# Patient Record
Sex: MALE | Race: WHITE | HISPANIC OR LATINO
[De-identification: names, ages, dates, MRNs, and addresses within clinical notes are randomized per-mention and may not be internally consistent; named-entity substitution may affect disease eponyms.]

---

## 2018-05-22 ENCOUNTER — APPOINTMENT (OUTPATIENT)
Dept: INTERNAL MEDICINE | Facility: CLINIC | Age: 83
End: 2018-05-22

## 2019-08-15 ENCOUNTER — INPATIENT (INPATIENT)
Facility: HOSPITAL | Age: 84
LOS: 4 days | Discharge: HOME | DRG: 378 | End: 2019-08-20
Attending: HOSPITALIST | Admitting: HOSPITALIST
Payer: COMMERCIAL

## 2019-08-15 VITALS
DIASTOLIC BLOOD PRESSURE: 52 MMHG | TEMPERATURE: 99 F | SYSTOLIC BLOOD PRESSURE: 102 MMHG | HEIGHT: 65 IN | OXYGEN SATURATION: 98 % | HEART RATE: 101 BPM | WEIGHT: 102.07 LBS | RESPIRATION RATE: 16 BRPM

## 2019-08-15 LAB
ABO RH CONFIRMATION: SIGNIFICANT CHANGE UP
ALBUMIN SERPL ELPH-MCNC: 3.9 G/DL — SIGNIFICANT CHANGE UP (ref 3.3–5.2)
ALP SERPL-CCNC: 87 U/L — SIGNIFICANT CHANGE UP (ref 40–120)
ALT FLD-CCNC: 23 U/L — SIGNIFICANT CHANGE UP
ANION GAP SERPL CALC-SCNC: 11 MMOL/L — SIGNIFICANT CHANGE UP (ref 5–17)
ANISOCYTOSIS BLD QL: SLIGHT — SIGNIFICANT CHANGE UP
APTT BLD: 24.9 SEC — LOW (ref 27.5–36.3)
AST SERPL-CCNC: 56 U/L — HIGH
BASOPHILS # BLD AUTO: 0.02 K/UL — SIGNIFICANT CHANGE UP (ref 0–0.2)
BASOPHILS NFR BLD AUTO: 0.2 % — SIGNIFICANT CHANGE UP (ref 0–2)
BILIRUB SERPL-MCNC: 0.5 MG/DL — SIGNIFICANT CHANGE UP (ref 0.4–2)
BLD GP AB SCN SERPL QL: SIGNIFICANT CHANGE UP
BUN SERPL-MCNC: 14 MG/DL — SIGNIFICANT CHANGE UP (ref 8–20)
CALCIUM SERPL-MCNC: 9.6 MG/DL — SIGNIFICANT CHANGE UP (ref 8.6–10.2)
CHLORIDE SERPL-SCNC: 98 MMOL/L — SIGNIFICANT CHANGE UP (ref 98–107)
CO2 SERPL-SCNC: 26 MMOL/L — SIGNIFICANT CHANGE UP (ref 22–29)
CREAT SERPL-MCNC: 0.63 MG/DL — SIGNIFICANT CHANGE UP (ref 0.5–1.3)
ELLIPTOCYTES BLD QL SMEAR: SLIGHT — SIGNIFICANT CHANGE UP
EOSINOPHIL # BLD AUTO: 0.05 K/UL — SIGNIFICANT CHANGE UP (ref 0–0.5)
EOSINOPHIL NFR BLD AUTO: 0.6 % — SIGNIFICANT CHANGE UP (ref 0–6)
GLUCOSE SERPL-MCNC: 107 MG/DL — SIGNIFICANT CHANGE UP (ref 70–115)
HCT VFR BLD CALC: 25.7 % — LOW (ref 39–50)
HGB BLD-MCNC: 7.9 G/DL — LOW (ref 13–17)
HYPOCHROMIA BLD QL: SLIGHT — SIGNIFICANT CHANGE UP
IMM GRANULOCYTES NFR BLD AUTO: 0.4 % — SIGNIFICANT CHANGE UP (ref 0–1.5)
INR BLD: 1.13 RATIO — SIGNIFICANT CHANGE UP (ref 0.88–1.16)
LYMPHOCYTES # BLD AUTO: 2.02 K/UL — SIGNIFICANT CHANGE UP (ref 1–3.3)
LYMPHOCYTES # BLD AUTO: 24.6 % — SIGNIFICANT CHANGE UP (ref 13–44)
MACROCYTES BLD QL: SIGNIFICANT CHANGE UP
MANUAL SMEAR VERIFICATION: SIGNIFICANT CHANGE UP
MCHC RBC-ENTMCNC: 30.7 GM/DL — LOW (ref 32–36)
MCHC RBC-ENTMCNC: 33.3 PG — SIGNIFICANT CHANGE UP (ref 27–34)
MCV RBC AUTO: 108.4 FL — HIGH (ref 80–100)
MICROCYTES BLD QL: SLIGHT — SIGNIFICANT CHANGE UP
MONOCYTES # BLD AUTO: 1.01 K/UL — HIGH (ref 0–0.9)
MONOCYTES NFR BLD AUTO: 12.3 % — SIGNIFICANT CHANGE UP (ref 2–14)
NEUTROPHILS # BLD AUTO: 5.08 K/UL — SIGNIFICANT CHANGE UP (ref 1.8–7.4)
NEUTROPHILS NFR BLD AUTO: 61.9 % — SIGNIFICANT CHANGE UP (ref 43–77)
OB PNL STL: POSITIVE
OVALOCYTES BLD QL SMEAR: SLIGHT — SIGNIFICANT CHANGE UP
PLAT MORPH BLD: NORMAL — SIGNIFICANT CHANGE UP
PLATELET # BLD AUTO: 208 K/UL — SIGNIFICANT CHANGE UP (ref 150–400)
POIKILOCYTOSIS BLD QL AUTO: SLIGHT — SIGNIFICANT CHANGE UP
POLYCHROMASIA BLD QL SMEAR: SIGNIFICANT CHANGE UP
POTASSIUM SERPL-MCNC: 4.6 MMOL/L — SIGNIFICANT CHANGE UP (ref 3.5–5.3)
POTASSIUM SERPL-SCNC: 4.6 MMOL/L — SIGNIFICANT CHANGE UP (ref 3.5–5.3)
PROT SERPL-MCNC: 7.3 G/DL — SIGNIFICANT CHANGE UP (ref 6.6–8.7)
PROTHROM AB SERPL-ACNC: 13.1 SEC — HIGH (ref 10–12.9)
RBC # BLD: 2.37 M/UL — LOW (ref 4.2–5.8)
RBC # FLD: 16.5 % — HIGH (ref 10.3–14.5)
RBC BLD AUTO: ABNORMAL
SODIUM SERPL-SCNC: 135 MMOL/L — SIGNIFICANT CHANGE UP (ref 135–145)
TARGETS BLD QL SMEAR: SLIGHT — SIGNIFICANT CHANGE UP
TROPONIN T SERPL-MCNC: <0.01 NG/ML — SIGNIFICANT CHANGE UP (ref 0–0.06)
WBC # BLD: 8.21 K/UL — SIGNIFICANT CHANGE UP (ref 3.8–10.5)
WBC # FLD AUTO: 8.21 K/UL — SIGNIFICANT CHANGE UP (ref 3.8–10.5)

## 2019-08-15 PROCEDURE — 71045 X-RAY EXAM CHEST 1 VIEW: CPT | Mod: 26

## 2019-08-15 PROCEDURE — 70450 CT HEAD/BRAIN W/O DYE: CPT | Mod: 26

## 2019-08-15 PROCEDURE — 93010 ELECTROCARDIOGRAM REPORT: CPT

## 2019-08-15 PROCEDURE — 99285 EMERGENCY DEPT VISIT HI MDM: CPT

## 2019-08-15 RX ORDER — SODIUM CHLORIDE 9 MG/ML
1000 INJECTION INTRAMUSCULAR; INTRAVENOUS; SUBCUTANEOUS ONCE
Refills: 0 | Status: COMPLETED | OUTPATIENT
Start: 2019-08-15 | End: 2019-08-15

## 2019-08-15 RX ADMIN — SODIUM CHLORIDE 1000 MILLILITER(S): 9 INJECTION INTRAMUSCULAR; INTRAVENOUS; SUBCUTANEOUS at 21:45

## 2019-08-15 RX ADMIN — SODIUM CHLORIDE 1000 MILLILITER(S): 9 INJECTION INTRAMUSCULAR; INTRAVENOUS; SUBCUTANEOUS at 20:45

## 2019-08-15 NOTE — ED ADULT NURSE NOTE - OBJECTIVE STATEMENT
Pt c/o black tarry stool, dizziness x 7 days. Pt c/o black tarry stool, dizziness x 7 days. Pt also has a inguinal hernia that family states "he has had for 3o tears". Pt family states they followed up with many people and the pt kept being told different things and so he stopped worrying about. Pt has hx of HTN, prostate ca.

## 2019-08-15 NOTE — ED PROVIDER NOTE - OBJECTIVE STATEMENT
83yoM; with pmh signif for chronic inguinal hernia, HTN, HLD; now p/w lightheadedness--x3 days, near-syncope sensation. c/o dark stools x8 days, x2-3 episodes daily. denies f/c/s. denies n/v/d. c/o increased pain over hernia site.  last BM and flatus few hours ago.  denies dysuria, hematuria, frequency, urgency. denies melena, hematochezia.  PMH; chronic inguinal hernia, HTn, hld  SOCIAL: No tobacco/illicit substance use/socialEtOH

## 2019-08-15 NOTE — ED PROVIDER NOTE - NS ED ROS FT
Constitutional: (-) fever  (-)chills  (-)sweats  Eyes/ENT: (-) blurry vision, (-) epistaxis  (-)rhinorrhea   (-) sore throat    Cardiovascular: (-) chest pain, (-) palpitations (-) edema   Respiratory: (-) cough, (-) shortness of breath   Gastrointestinal: (-)nausea  (-)vomiting, (-) diarrhea  (-) abdominal pain +dark stool  :  (-)dysuria, (-)frequency, (-)urgency, (-)hematuria  Musculoskeletal: (-) neck pain, (-) back pain, (-) joint pain  Integumentary: (-) rash, (-) edema  Neurological: (-) headache, (-) altered mental status  (-)LOC  +lightheadedness

## 2019-08-15 NOTE — ED STATDOCS - PROGRESS NOTE DETAILS
Megha Mckee for Dr Enmanuel Padilla :L 84 yo M, with hx of AICD, presenting to the ED with c/o dizziness. Pt states for the last 8 days, he has been having room spinning dizziness. Also with some black stools. nO hx of the same. Denies CP, SOB, and fever. Sent to main for further evaluation.

## 2019-08-15 NOTE — ED PROVIDER NOTE - PHYSICAL EXAMINATION
General:     NAD, well-nourished, well-appearing  Head:     NC/AT, EOMI, oral mucosa moist  Neck:     trachea midline  Lungs:     CTA b/l, no w/r/r  CVS:     S1S2, RRR, no m/g/r  Abd:     +BS, s/nt/nd, no organomegaly  Rectal: dark stool   (chaperoned by JONATHAN Doe):  partially reducible right inguinal hernia  Ext:    2+ radial and pedal pulses, no c/c/e  Neuro: AAOx3, no sensory/motor deficits

## 2019-08-16 DIAGNOSIS — R19.5 OTHER FECAL ABNORMALITIES: ICD-10-CM

## 2019-08-16 DIAGNOSIS — K92.2 GASTROINTESTINAL HEMORRHAGE, UNSPECIFIED: ICD-10-CM

## 2019-08-16 DIAGNOSIS — D64.9 ANEMIA, UNSPECIFIED: ICD-10-CM

## 2019-08-16 LAB
FERRITIN SERPL-MCNC: 28 NG/ML — LOW (ref 30–400)
FOLATE SERPL-MCNC: 17.3 NG/ML — SIGNIFICANT CHANGE UP
HCT VFR BLD CALC: 30.8 % — LOW (ref 39–50)
HGB BLD-MCNC: 9.7 G/DL — LOW (ref 13–17)
IRON SATN MFR SERPL: 12 % — LOW (ref 16–55)
IRON SATN MFR SERPL: 41 UG/DL — LOW (ref 59–158)
MCHC RBC-ENTMCNC: 31.1 PG — SIGNIFICANT CHANGE UP (ref 27–34)
MCHC RBC-ENTMCNC: 31.5 GM/DL — LOW (ref 32–36)
MCV RBC AUTO: 98.7 FL — SIGNIFICANT CHANGE UP (ref 80–100)
PLATELET # BLD AUTO: 165 K/UL — SIGNIFICANT CHANGE UP (ref 150–400)
RBC # BLD: 3.12 M/UL — LOW (ref 4.2–5.8)
RBC # FLD: 18.5 % — HIGH (ref 10.3–14.5)
TIBC SERPL-MCNC: 332 UG/DL — SIGNIFICANT CHANGE UP (ref 220–430)
TRANSFERRIN SERPL-MCNC: 232 MG/DL — SIGNIFICANT CHANGE UP (ref 180–329)
VIT B12 SERPL-MCNC: 524 PG/ML — SIGNIFICANT CHANGE UP (ref 232–1245)
WBC # BLD: 8.26 K/UL — SIGNIFICANT CHANGE UP (ref 3.8–10.5)
WBC # FLD AUTO: 8.26 K/UL — SIGNIFICANT CHANGE UP (ref 3.8–10.5)

## 2019-08-16 PROCEDURE — 99232 SBSQ HOSP IP/OBS MODERATE 35: CPT

## 2019-08-16 PROCEDURE — 99223 1ST HOSP IP/OBS HIGH 75: CPT

## 2019-08-16 PROCEDURE — 12345: CPT | Mod: NC

## 2019-08-16 PROCEDURE — 74177 CT ABD & PELVIS W/CONTRAST: CPT | Mod: 26

## 2019-08-16 PROCEDURE — 71045 X-RAY EXAM CHEST 1 VIEW: CPT | Mod: 26

## 2019-08-16 RX ORDER — PANTOPRAZOLE SODIUM 20 MG/1
40 TABLET, DELAYED RELEASE ORAL EVERY 12 HOURS
Refills: 0 | Status: DISCONTINUED | OUTPATIENT
Start: 2019-08-16 | End: 2019-08-19

## 2019-08-16 RX ORDER — SPIRONOLACTONE 25 MG/1
25 TABLET, FILM COATED ORAL DAILY
Refills: 0 | Status: DISCONTINUED | OUTPATIENT
Start: 2019-08-16 | End: 2019-08-20

## 2019-08-16 RX ORDER — PANTOPRAZOLE SODIUM 20 MG/1
8 TABLET, DELAYED RELEASE ORAL
Qty: 80 | Refills: 0 | Status: DISCONTINUED | OUTPATIENT
Start: 2019-08-16 | End: 2019-08-16

## 2019-08-16 RX ORDER — FUROSEMIDE 40 MG
20 TABLET ORAL ONCE
Refills: 0 | Status: COMPLETED | OUTPATIENT
Start: 2019-08-16 | End: 2019-08-16

## 2019-08-16 RX ORDER — SODIUM CHLORIDE 9 MG/ML
1000 INJECTION INTRAMUSCULAR; INTRAVENOUS; SUBCUTANEOUS
Refills: 0 | Status: DISCONTINUED | OUTPATIENT
Start: 2019-08-16 | End: 2019-08-16

## 2019-08-16 RX ADMIN — PANTOPRAZOLE SODIUM 40 MILLIGRAM(S): 20 TABLET, DELAYED RELEASE ORAL at 17:23

## 2019-08-16 RX ADMIN — SODIUM CHLORIDE 100 MILLILITER(S): 9 INJECTION INTRAMUSCULAR; INTRAVENOUS; SUBCUTANEOUS at 10:35

## 2019-08-16 RX ADMIN — PANTOPRAZOLE SODIUM 10 MG/HR: 20 TABLET, DELAYED RELEASE ORAL at 06:53

## 2019-08-16 RX ADMIN — Medication 20 MILLIGRAM(S): at 23:38

## 2019-08-16 NOTE — PROGRESS NOTE ADULT - SUBJECTIVE AND OBJECTIVE BOX
OSCAR ECHOLS  ----------------------------------------  The patient was seen and evaluated for anemia.  The patient is in no acute distress.  Denied any chest pain, palpitations, dyspnea, or abdominal pain.  Offers no complaints.    Vital Signs Last 24 Hrs  T(C): 36.6 (16 Aug 2019 10:19), Max: 37.1 (15 Aug 2019 18:14)  T(F): 97.9 (16 Aug 2019 10:19), Max: 98.7 (15 Aug 2019 18:14)  HR: 79 (16 Aug 2019 10:19) (79 - 101)  BP: 96/54 (16 Aug 2019 10:19) (88/45 - 103/60)  BP(mean): --  RR: 18 (16 Aug 2019 10:19) (16 - 18)  SpO2: 97% (16 Aug 2019 10:19) (92% - 100%)    PHYSICAL EXAMINATION:  ----------------------------------------  General appearance: No acute distress, Awake, Alert  HEENT: Normocephalic, Atraumatic, Conjunctiva clear, EOMI  Neck: Supple, No JVD, No tenderness  Lungs: Breath sound equal bilaterally, No wheezes, No rales  Cardiovascular: S1S2, Regular rhythm  Abdomen: Soft, Nontender, Nondistended, No guarding/rebound, Positive bowel sounds  Extremities: No clubbing, No cyanosis, No edema, No calf tenderness  Neuro: Strength equal bilaterally, No tremors  Psychiatric: Appropriate mood, Normal affect    LABORATORY STUDIES:  ----------------------------------------             9.7    8.26  )-----------( 165      ( 16 Aug 2019 11:36 )             30.8     08-15    135  |  98  |  14.0  ----------------------------<  107  4.6   |  26.0  |  0.63    Ca    9.6      15 Aug 2019 20:48    TPro  7.3  /  Alb  3.9  /  TBili  0.5  /  DBili  x   /  AST  56<H>  /  ALT  23  /  AlkPhos  87  08-15    LIVER FUNCTIONS - ( 15 Aug 2019 20:48 )  Alb: 3.9 g/dL / Pro: 7.3 g/dL / ALK PHOS: 87 U/L / ALT: 23 U/L / AST: 56 U/L / GGT: x           PT/INR - ( 15 Aug 2019 20:48 )   PT: 13.1 sec;   INR: 1.13 ratio    PTT - ( 15 Aug 2019 20:48 )  PTT:24.9 sec    CARDIAC MARKERS ( 15 Aug 2019 20:48 )  x     / <0.01 ng/mL / x     / x     / x        MEDICATIONS  (STANDING):  pantoprazole  Injectable 40 milliGRAM(s) IV Push every 12 hours  sodium chloride 0.9%. 1000 milliLiter(s) (100 mL/Hr) IV Continuous <Continuous>      ASSESSMENT / PLAN:  ----------------------------------------  82 y/o male with PMH of HTN, HLD, chronic hernia, came to the ED complaining of dizziness and near syncope of 3 days duration. Patient noted he has been having dark stools for about 8 days now approximately 2 bowel movement per day and every episode is dark. He has no prior hx; has never done colonoscopy in the past.     Symptomatic anemia 2/2 GI bleed   Admit to monitor bed   FOBT: positive   CT abdomen: no acute bleeding   H/H: 7.9/25.7  2 units of PRBC ordered in the ED; 1 given, the other pending   PPI drip started  GI consult     Inguinal hernia   CT abdomen: large right inguinal hernia with non-obstructed small bowel   Surgical follow up as outpatient as needed     Abdominal Aneurysm  CT abdomen: new aneurysmal dilation of infrarenal abdominal aorta 3.4cm   Monitor and vascular follow up as needed.      HTN/HLD   Patient could not recall the name of his medications; med rec in AM please   Hold anti-hypertensive for now due to soft BP   Monitor BP     Supportive   DVT prophylaxis: CSD   Diet: NPO     Estimate date of discharge OSCAR ECHOLS  ----------------------------------------  The patient was seen and evaluated for anemia.  The patient is in no acute distress.  Denied any chest pain, palpitations, dyspnea, or abdominal pain.  Offers no complaints.    Vital Signs Last 24 Hrs  T(C): 36.6 (16 Aug 2019 10:19), Max: 37.1 (15 Aug 2019 18:14)  T(F): 97.9 (16 Aug 2019 10:19), Max: 98.7 (15 Aug 2019 18:14)  HR: 79 (16 Aug 2019 10:19) (79 - 101)  BP: 96/54 (16 Aug 2019 10:19) (88/45 - 103/60)  BP(mean): --  RR: 18 (16 Aug 2019 10:19) (16 - 18)  SpO2: 97% (16 Aug 2019 10:19) (92% - 100%)    PHYSICAL EXAMINATION:  ----------------------------------------  General appearance: No acute distress, Awake, Alert  HEENT: Normocephalic, Atraumatic, Conjunctiva clear, EOMI  Neck: Supple, No JVD, No tenderness  Lungs: Breath sound equal bilaterally, No wheezes, No rales  Cardiovascular: S1S2, Regular rhythm  Abdomen: Soft, Nontender, Nondistended, No guarding/rebound, Positive bowel sounds  Extremities: No clubbing, No cyanosis, No edema, No calf tenderness  Neuro: Strength equal bilaterally, No tremors  Psychiatric: Appropriate mood, Normal affect    LABORATORY STUDIES:  ----------------------------------------             9.7    8.26  )-----------( 165      ( 16 Aug 2019 11:36 )             30.8     08-15    135  |  98  |  14.0  ----------------------------<  107  4.6   |  26.0  |  0.63    Ca    9.6      15 Aug 2019 20:48    TPro  7.3  /  Alb  3.9  /  TBili  0.5  /  DBili  x   /  AST  56<H>  /  ALT  23  /  AlkPhos  87  08-15    LIVER FUNCTIONS - ( 15 Aug 2019 20:48 )  Alb: 3.9 g/dL / Pro: 7.3 g/dL / ALK PHOS: 87 U/L / ALT: 23 U/L / AST: 56 U/L / GGT: x           PT/INR - ( 15 Aug 2019 20:48 )   PT: 13.1 sec;   INR: 1.13 ratio    PTT - ( 15 Aug 2019 20:48 )  PTT:24.9 sec    CARDIAC MARKERS ( 15 Aug 2019 20:48 )  x     / <0.01 ng/mL / x     / x     / x        MEDICATIONS  (STANDING):  pantoprazole  Injectable 40 milliGRAM(s) IV Push every 12 hours  sodium chloride 0.9%. 1000 milliLiter(s) (100 mL/Hr) IV Continuous <Continuous>      ASSESSMENT / PLAN:  ----------------------------------------  84 y/o male with PMH of HTN, HLD, chronic hernia, came to the ED complaining of dizziness and near syncope of 3 days duration. Patient noted he has been having dark stools for about 8 days now approximately 2 bowel movement per day and every episode is dark. He has no prior hx; has never done colonoscopy in the past.     Acute blood loss anemia / Gastrointestinal hemorrhage - Status post transfusion of packed red blood cells. On pantoprazole infusion. Gastroenterology consultation noted. For eventual endoscopic evaluation. CBC to be repeated.    Hypertension - Close blood pressure monitoring. On intravenous fluids for hypotension.    Inguinal hernia - No obstruction noted.

## 2019-08-16 NOTE — ED ADULT NURSE REASSESSMENT NOTE - NS ED NURSE REASSESS COMMENT FT1
pt status unchanged, refer to flowsheet and chart, pt safety maintained, pt hemodynamically stable. Pt awaiting CT. POC explained to pt and family, verbalized understanding.

## 2019-08-16 NOTE — H&P ADULT - ASSESSMENT
82 y/o male with PMH of HTN, HLD came to the ED complaining of dizziness and near syncope of 3 days duration. Patient noted he has been having dark stools for about 8 days now approximately 2 bowel movement per day and every episode is dark. He has no nausea/vomiting, abdominal pain, chest pain, shortness of breath, palpitation, HA, LOC, recent travel, sick contact, hematuria, hematochezia.       Symptomatic anemia 2/2 GI bleed   Admit to monitor bed   FOBT: positive   H/H: 7.9/25.7  2 units of PRBC given in the ED   PPI 40mg IV bid   GI consult     HTN/HLD   Hold anti-hypertensive for now due to soft BP   Monitor BP     Supportive   DVT prophylaxis: CSD   Diet: NPO 82 y/o male with PMH of HTN, HLD, chronic hernia, came to the ED complaining of dizziness and near syncope of 3 days duration. Patient noted he has been having dark stools for about 8 days now approximately 2 bowel movement per day and every episode is dark. He has no prior hx; has never done colonoscopy in the past.     Symptomatic anemia 2/2 GI bleed   Admit to monitor bed   FOBT: positive   CT abdomen: no acute bleeding   H/H: 7.9/25.7  2 units of PRBC ordered in the ED; 1 given, the other pending   PPI drip started  GI consult     Inguinal hernia   CT abdomen: large right inguinal hernia with non-obstructed small bowel   Surgical follow up as outpatient as needed     Abdominal Aneurysm  CT abdomen: new aneurysmal dilation of infrarenal abdominal aorta 3.4cm   Monitor and vascular follow up as needed.      HTN/HLD   Patient could not recall the name of his medications; med rec in AM please   Hold anti-hypertensive for now due to soft BP   Monitor BP     Supportive   DVT prophylaxis: CSD   Diet: NPO

## 2019-08-16 NOTE — H&P ADULT - HISTORY OF PRESENT ILLNESS
84 y/o male with PMH of HTN, HLD came to the ED complaining of dizziness and near syncope of 3 days duration. Patient noted he has been having dark stools for about 8 days now approximately 2 bowel movement per day and every episode is dark. He has no nausea/vomiting, abdominal pain, chest pain, shortness of breath, palpitation, HA, LOC, recent travel, sick contact, hematuria, hematochezia. 82 y/o male with PMH of HTN, HLD, chronic hernia, came to the ED complaining of dizziness and near syncope of 3 days duration. Patient noted he has been having dark stools for about 8 days now approximately 2 bowel movement per day and every episode is dark. He has no prior hx; has never done colonoscopy in the past. He has no nausea/vomiting, abdominal pain, chest pain, shortness of breath, palpitation, HA, LOC, recent travel, sick contact, hematuria, hematochezia.

## 2019-08-16 NOTE — H&P ADULT - NSICDXPASTMEDICALHX_GEN_ALL_CORE_FT
PAST MEDICAL HISTORY:  HLD (hyperlipidemia)     HTN (hypertension) PAST MEDICAL HISTORY:  HLD (hyperlipidemia)     HTN (hypertension)     Inguinal hernia

## 2019-08-16 NOTE — CHART NOTE - NSCHARTNOTEFT_GEN_A_CORE
CC: difficulty breathing  INTERVAL HPI: Called by RN because Pt states he has difficulty breathing. Seen and evaluated at bedside with Family present. Pt states that he sees cardiologist Dr Zapata outpt and denies history of asthma, copd or heart failure. Pt does states that he does now have a weak heart and PVD but does not know more information. Pt family state that he often gets shortness of breath on exertion when ambulating less than 10 feet. At this time, Pt states that SOB started when he was laying in bed. Pt had been receiving IVF at 100mL/Hr for low BP. No associated fever, chills, chest pain, palpitations, swelling, cough, sputum production, abdominal pain, n/v/d or any other complaints.     REVIEW OF SYSTEMS:  CONSTITUTIONAL: No fever, weight loss, or fatigue  EYES: No eye pain, visual disturbances, or discharge  ENT:  No difficulty hearing, tinnitus, vertigo; No sinus or throat pain  NECK: No pain or stiffness  RESPIRATORY: +shortness of breath  CARDIOVASCULAR: No chest pain, palpitations, dizziness, or leg swelling  GASTROINTESTINAL: No abdominal or epigastric pain. No nausea, vomiting, or hematemesis; No diarrhea or constipation.    GENITOURINARY: No dysuria, frequency, hematuria, or incontinence  NEUROLOGICAL: No headaches, memory loss, loss of strength, numbness, or tremors  SKIN: No itching, burning, rashes, or lesions   MUSCULOSKELETAL: No joint pain or swelling; No muscle, back, or extremity pain    Allergies  No Known Allergies    HEALTH ISSUES - PROBLEM Dx:  Occult blood positive stool: Occult blood positive stool  Symptomatic anemia: Symptomatic anemia    PAST MEDICAL & SURGICAL HISTORY:  Inguinal hernia  HLD (hyperlipidemia)  HTN (hypertension)    VITAL SIGNS:  T(C): 36.8 (08-16-19 @ 21:15), Max: 37 (08-16-19 @ 08:00)  HR: 65 (08-16-19 @ 21:15) (65 - 89)  BP: 105/62 (08-16-19 @ 21:15) (88/45 - 148/65)  RR: 20 (08-16-19 @ 21:15) (16 - 20)  SpO2: 93% (08-16-19 @ 21:15) (92% - 100%)  Wt(kg): --Vital Signs Last 24 Hrs  T(C): 36.8 (16 Aug 2019 21:15), Max: 37 (16 Aug 2019 08:00)  T(F): 98.2 (16 Aug 2019 21:15), Max: 98.6 (16 Aug 2019 08:00)  HR: 65 (16 Aug 2019 21:15) (65 - 89)  BP: 105/62 (16 Aug 2019 21:15) (88/45 - 148/65)  BP(mean): --  RR: 20 (16 Aug 2019 21:15) (16 - 20)  SpO2: 93% (16 Aug 2019 21:15) (92% - 100%)    PHYSICAL EXAM:  GENERAL: Elderly man, lying in bed comfortably in NAD  HEAD:  Atraumatic, Normocephalic  EYES: EOMI, PERRLA, conjunctiva and sclera clear  ENT: Moist mucous membranes  NECK: Supple, No JVD  CHEST/LUNG: scant wheezing in RLL, Otherwise clear to auscultation bilaterally; No rales, rhonchi or rubs. Unlabored respirations. No accessory muscle usage   HEART: Regular rate and rhythm; No murmurs, rubs, or gallops  ABDOMEN: Bowel sounds present; Soft, Nontender, Nondistended  EXTREMITIES:  2+ Peripheral Pulses, brisk capillary refill. No clubbing, cyanosis, or edema  NERVOUS SYSTEM:  Alert & Oriented X3, speech clear, FROM x 4 extremities. No deficits   SKIN: No rashes or lesions    LABS:                     9.7    8.26  )-----------( 165      ( 16 Aug 2019 11:36 )             30.8     08-15    135  |  98  |  14.0  ----------------------------<  107  4.6   |  26.0  |  0.63    Ca    9.6      15 Aug 2019 20:48    TPro  7.3  /  Alb  3.9  /  TBili  0.5  /  DBili  x   /  AST  56<H>  /  ALT  23  /  AlkPhos  87  08-15  PT/INR - ( 15 Aug 2019 20:48 )   PT: 13.1 sec;   INR: 1.13 ratio    PTT - ( 15 Aug 2019 20:48 )  PTT:24.9 sec    ASSESSMENT/ PLAN: 82 y/o male with PMH of HTN, HLD, chronic hernia, came to the ED complaining of dizziness and near syncope of 3 days duration found to have Lower GIB. H/H stabilized s/p total 2 units PRBCs.   1. Shortness of breath 2/2 ?Fluid overload due to IVF  -chart reviewed, unknown EF  -H/H stable  -Urgent CXR ordered  -IVF stopped immediately   -BUN/ Cr WNL but IV Lasix held for BP. CXR done about 1 hour after fluids stopped and shows less vascular congestion compared to CXR at 10am today. Will continue to monitor without fluids for now.   -Pt seen after IVF stopped and appears at rest and comfortable  -[facility] consulted, Dr. Zapata to see Pt in AM  -Also, verified with Pts family that Pt takes Spironolactone 25mg PO daily for HTN and Flomax 0.4mg PO daily for hx of prostate cancer. Pt urinating fine. Explained to Family that both medications are held for now due to borderline BP  -Pt's Son and Daughter, Omid and Linda at bedside and aware of plan. Questions answered and concerns addressed  -AM labs ordered. Continue to trend BP. Hemodynamically stable. CC: difficulty breathing  INTERVAL HPI: Called by RN because Pt states he has difficulty breathing. Seen and evaluated at bedside with Family present. Pt states that he sees cardiologist Dr Zapata outpt and denies history of asthma, copd or heart failure. Pt does states that he does now have a weak heart and PVD but does not know more information. Pt family state that he often gets shortness of breath on exertion when ambulating less than 10 feet. At this time, Pt states that SOB started when he was laying in bed. Pt had been receiving IVF at 100mL/Hr for low BP. No associated fever, chills, chest pain, palpitations, swelling, cough, sputum production, abdominal pain, n/v/d or any other complaints.     REVIEW OF SYSTEMS:  CONSTITUTIONAL: No fever, weight loss, or fatigue  EYES: No eye pain, visual disturbances, or discharge  ENT:  No difficulty hearing, tinnitus, vertigo; No sinus or throat pain  NECK: No pain or stiffness  RESPIRATORY: +shortness of breath  CARDIOVASCULAR: No chest pain, palpitations, dizziness, or leg swelling  GASTROINTESTINAL: No abdominal or epigastric pain. No nausea, vomiting, or hematemesis; No diarrhea or constipation.    GENITOURINARY: No dysuria, frequency, hematuria, or incontinence  NEUROLOGICAL: No headaches, memory loss, loss of strength, numbness, or tremors  SKIN: No itching, burning, rashes, or lesions   MUSCULOSKELETAL: No joint pain or swelling; No muscle, back, or extremity pain    Allergies  No Known Allergies    HEALTH ISSUES - PROBLEM Dx:  Occult blood positive stool: Occult blood positive stool  Symptomatic anemia: Symptomatic anemia    PAST MEDICAL & SURGICAL HISTORY:  Inguinal hernia  HLD (hyperlipidemia)  HTN (hypertension)    VITAL SIGNS:  T(C): 36.8 (08-16-19 @ 21:15), Max: 37 (08-16-19 @ 08:00)  HR: 65 (08-16-19 @ 21:15) (65 - 89)  BP: 105/62 (08-16-19 @ 21:15) (88/45 - 148/65)  RR: 20 (08-16-19 @ 21:15) (16 - 20)  SpO2: 93% (08-16-19 @ 21:15) (92% - 100%)  Wt(kg): --Vital Signs Last 24 Hrs  T(C): 36.8 (16 Aug 2019 21:15), Max: 37 (16 Aug 2019 08:00)  T(F): 98.2 (16 Aug 2019 21:15), Max: 98.6 (16 Aug 2019 08:00)  HR: 65 (16 Aug 2019 21:15) (65 - 89)  BP: 105/62 (16 Aug 2019 21:15) (88/45 - 148/65)  BP(mean): --  RR: 20 (16 Aug 2019 21:15) (16 - 20)  SpO2: 93% (16 Aug 2019 21:15) (92% - 100%)    PHYSICAL EXAM:  GENERAL: Elderly man, lying in bed comfortably in NAD  HEAD:  Atraumatic, Normocephalic  EYES: EOMI, PERRLA, conjunctiva and sclera clear  ENT: Moist mucous membranes  NECK: Supple, No JVD  CHEST/LUNG: scant wheezing in RLL, diminished breath sounds. Otherwise clear to auscultation bilaterally; No rales, rhonchi or rubs. Unlabored respirations. No accessory muscle usage   HEART: Regular rate and rhythm; No murmurs, rubs, or gallops  ABDOMEN: Bowel sounds present; Soft, Nontender, Nondistended  EXTREMITIES:  2+ Peripheral Pulses, brisk capillary refill. No clubbing, cyanosis, or edema  NERVOUS SYSTEM:  Alert & Oriented X3, speech clear, FROM x 4 extremities. No deficits   SKIN: No rashes or lesions    LABS:                     9.7    8.26  )-----------( 165      ( 16 Aug 2019 11:36 )             30.8     08-15    135  |  98  |  14.0  ----------------------------<  107  4.6   |  26.0  |  0.63    Ca    9.6      15 Aug 2019 20:48    TPro  7.3  /  Alb  3.9  /  TBili  0.5  /  DBili  x   /  AST  56<H>  /  ALT  23  /  AlkPhos  87  08-15  PT/INR - ( 15 Aug 2019 20:48 )   PT: 13.1 sec;   INR: 1.13 ratio    PTT - ( 15 Aug 2019 20:48 )  PTT:24.9 sec    ASSESSMENT/ PLAN: 84 y/o male with PMH of HTN, HLD, chronic hernia, came to the ED complaining of dizziness and near syncope of 3 days duration found to have Lower GIB. H/H stabilized s/p total 2 units PRBCs.   1. Shortness of breath 2/2 ?Fluid overload due to IVF  -chart reviewed, unknown EF  -H/H stable  -Urgent CXR ordered  -IVF stopped immediately   -BUN/ Cr WNL but IV Lasix held for BP. CXR done about 1 hour after fluids stopped and shows less vascular congestion compared to CXR at 10am today. Will continue to monitor without fluids for now.   -Pt seen after IVF stopped and appears at rest and comfortable  -[facility] consulted, Dr. Zapata to see Pt in AM  -Also, verified with Pts family that Pt takes Spironolactone 25mg PO daily for HTN and Flomax 0.4mg PO daily for hx of prostate cancer. Pt urinating fine. Explained to Family that both medications are held for now due to borderline BP  -Pt's Son and Daughter, Omid and Linda at bedside and aware of plan. Questions answered and concerns addressed  -AM labs ordered. Continue to trend BP. Hemodynamically stable.    Addendum 23:33  Pt complaining of sob again. Temp 97F, , HR 88, RR 22, O2 95% on room air. Pt is comfortable. Exam unchanged. Lasix 20mg IVP x1, supplemental O2 prn for pt comfort.

## 2019-08-16 NOTE — CONSULT NOTE ADULT - PROBLEM SELECTOR RECOMMENDATION 9
No clinical evidence of active GI bleeding. Iron studies ordered. Eventual EGD / Colonoscopy when optimized medically and seen and cleared by Cardiology, probably Monday. Pantoprazole BID. OK to feed pt. as there is no evidence of active GI bleeding. Transfuse to Hb > 8 grams. Repeat labs ordered for the AM.

## 2019-08-17 LAB
ANION GAP SERPL CALC-SCNC: 12 MMOL/L — SIGNIFICANT CHANGE UP (ref 5–17)
BASOPHILS # BLD AUTO: 0.02 K/UL — SIGNIFICANT CHANGE UP (ref 0–0.2)
BASOPHILS NFR BLD AUTO: 0.3 % — SIGNIFICANT CHANGE UP (ref 0–2)
BUN SERPL-MCNC: 11 MG/DL — SIGNIFICANT CHANGE UP (ref 8–20)
CALCIUM SERPL-MCNC: 8.6 MG/DL — SIGNIFICANT CHANGE UP (ref 8.6–10.2)
CHLORIDE SERPL-SCNC: 100 MMOL/L — SIGNIFICANT CHANGE UP (ref 98–107)
CO2 SERPL-SCNC: 25 MMOL/L — SIGNIFICANT CHANGE UP (ref 22–29)
CREAT SERPL-MCNC: 0.61 MG/DL — SIGNIFICANT CHANGE UP (ref 0.5–1.3)
EOSINOPHIL # BLD AUTO: 0.12 K/UL — SIGNIFICANT CHANGE UP (ref 0–0.5)
EOSINOPHIL NFR BLD AUTO: 1.5 % — SIGNIFICANT CHANGE UP (ref 0–6)
GLUCOSE SERPL-MCNC: 81 MG/DL — SIGNIFICANT CHANGE UP (ref 70–115)
HCT VFR BLD CALC: 30.6 % — LOW (ref 39–50)
HGB BLD-MCNC: 9.5 G/DL — LOW (ref 13–17)
IMM GRANULOCYTES NFR BLD AUTO: 0.4 % — SIGNIFICANT CHANGE UP (ref 0–1.5)
LYMPHOCYTES # BLD AUTO: 1.33 K/UL — SIGNIFICANT CHANGE UP (ref 1–3.3)
LYMPHOCYTES # BLD AUTO: 16.8 % — SIGNIFICANT CHANGE UP (ref 13–44)
MCHC RBC-ENTMCNC: 30.6 PG — SIGNIFICANT CHANGE UP (ref 27–34)
MCHC RBC-ENTMCNC: 31 GM/DL — LOW (ref 32–36)
MCV RBC AUTO: 98.7 FL — SIGNIFICANT CHANGE UP (ref 80–100)
MONOCYTES # BLD AUTO: 0.9 K/UL — SIGNIFICANT CHANGE UP (ref 0–0.9)
MONOCYTES NFR BLD AUTO: 11.4 % — SIGNIFICANT CHANGE UP (ref 2–14)
NEUTROPHILS # BLD AUTO: 5.5 K/UL — SIGNIFICANT CHANGE UP (ref 1.8–7.4)
NEUTROPHILS NFR BLD AUTO: 69.6 % — SIGNIFICANT CHANGE UP (ref 43–77)
PLATELET # BLD AUTO: 164 K/UL — SIGNIFICANT CHANGE UP (ref 150–400)
POTASSIUM SERPL-MCNC: 3.6 MMOL/L — SIGNIFICANT CHANGE UP (ref 3.5–5.3)
POTASSIUM SERPL-SCNC: 3.6 MMOL/L — SIGNIFICANT CHANGE UP (ref 3.5–5.3)
RBC # BLD: 3.1 M/UL — LOW (ref 4.2–5.8)
RBC # FLD: 19 % — HIGH (ref 10.3–14.5)
SODIUM SERPL-SCNC: 137 MMOL/L — SIGNIFICANT CHANGE UP (ref 135–145)
WBC # BLD: 7.9 K/UL — SIGNIFICANT CHANGE UP (ref 3.8–10.5)
WBC # FLD AUTO: 7.9 K/UL — SIGNIFICANT CHANGE UP (ref 3.8–10.5)

## 2019-08-17 PROCEDURE — 99232 SBSQ HOSP IP/OBS MODERATE 35: CPT

## 2019-08-17 RX ADMIN — PANTOPRAZOLE SODIUM 40 MILLIGRAM(S): 20 TABLET, DELAYED RELEASE ORAL at 17:35

## 2019-08-17 RX ADMIN — PANTOPRAZOLE SODIUM 40 MILLIGRAM(S): 20 TABLET, DELAYED RELEASE ORAL at 06:06

## 2019-08-17 NOTE — PROGRESS NOTE ADULT - SUBJECTIVE AND OBJECTIVE BOX
OSCAR ECHOLS  ----------------------------------------  The patient was seen and evaluated for anemia.  The patient is in no acute distress.  Denied any chest pain, palpitations, dyspnea, or abdominal pain.  Offers no complaints.    Vital Signs Last 24 Hrs  T(C): 36.6 (17 Aug 2019 15:59), Max: 37 (16 Aug 2019 17:03)  T(F): 97.8 (17 Aug 2019 15:59), Max: 98.6 (16 Aug 2019 17:03)  HR: 80 (17 Aug 2019 15:59) (65 - 89)  BP: 112/65 (17 Aug 2019 15:59) (93/56 - 148/65)  BP(mean): --  RR: 19 (17 Aug 2019 15:59) (18 - 20)  SpO2: 94% (17 Aug 2019 15:59) (93% - 96%)    PHYSICAL EXAMINATION:  ----------------------------------------  General appearance: No acute distress, Awake, Alert  HEENT: Normocephalic, Atraumatic, Conjunctiva clear, EOMI  Neck: Supple, No JVD, No tenderness  Lungs: Breath sound equal bilaterally, No wheezes, No rales  Cardiovascular: S1S2, Regular rhythm  Abdomen: Soft, Nontender, Nondistended, No guarding/rebound, Positive bowel sounds  Extremities: No clubbing, No cyanosis, No edema, No calf tenderness  Neuro: Strength equal bilaterally, No tremors  Psychiatric: Appropriate mood, Normal affect    LABORATORY STUDIES:  ----------------------------------------             9.5    7.90  )-----------( 164      ( 17 Aug 2019 06:15 )             30.6     08-17    137  |  100  |  11.0  ----------------------------<  81  3.6   |  25.0  |  0.61    Ca    8.6      17 Aug 2019 06:15    TPro  7.3  /  Alb  3.9  /  TBili  0.5  /  DBili  x   /  AST  56<H>  /  ALT  23  /  AlkPhos  87  08-15    LIVER FUNCTIONS - ( 15 Aug 2019 20:48 )  Alb: 3.9 g/dL / Pro: 7.3 g/dL / ALK PHOS: 87 U/L / ALT: 23 U/L / AST: 56 U/L / GGT: x           PT/INR - ( 15 Aug 2019 20:48 )   PT: 13.1 sec;   INR: 1.13 ratio    PTT - ( 15 Aug 2019 20:48 )  PTT:24.9 sec    CARDIAC MARKERS ( 15 Aug 2019 20:48 )  x     / <0.01 ng/mL / x     / x     / x        MEDICATIONS  (STANDING):  pantoprazole  Injectable 40 milliGRAM(s) IV Push every 12 hours  spironolactone 25 milliGRAM(s) Oral daily      ASSESSMENT / PLAN:  ----------------------------------------  84 y/o male with PMH of HTN, HLD, chronic hernia, came to the ED complaining of dizziness and near syncope of 3 days duration. Patient noted he has been having dark stools for about 8 days now approximately 2 bowel movement per day and every episode is dark. He has no prior hx; has never done colonoscopy in the past.     Acute blood loss anemia / Gastrointestinal hemorrhage - Status post transfusion of packed red blood cells. On pantoprazole infusion. For eventual endoscopic evaluation. Cardiology consultation noted. Blood count stable.    Hypertension - Close blood pressure monitoring. On intravenous fluids for hypotension.    Inguinal hernia - No obstruction noted.

## 2019-08-17 NOTE — CONSULT NOTE ADULT - SUBJECTIVE AND OBJECTIVE BOX
[facility], [facility]                                                    [address], [address], [place] [address]                                                         PHONE: [phone]    FAX: [phone] [address] [facility], [address], [place] [address]                                                 PHONE: [phone]    FAX: [phone]  *******************************************************************************    Reason for Consult: Pre-operative cardiac risk stratification    HPI:  OSCAR ECHOLS is a 83y Male    PAST MEDICAL & SURGICAL HISTORY:  Inguinal hernia  HLD (hyperlipidemia)  HTN (hypertension)      No Known Allergies      MEDICATIONS  (STANDING):  pantoprazole  Injectable 40 milliGRAM(s) IV Push every 12 hours  spironolactone 25 milliGRAM(s) Oral daily    MEDICATIONS  (PRN):      Social History: no active tobacco / EtOH / IVDA    Family History: No pertinent family history in first degree relatives      ROS: As noted above, otherwise unremarkable.    Vital Signs Last 24 Hrs  T(C): 36.6 (16 Aug 2019 23:39), Max: 37 (16 Aug 2019 08:00)  T(F): 97.9 (16 Aug 2019 23:39), Max: 98.6 (16 Aug 2019 08:00)  HR: 88 (16 Aug 2019 23:39) (65 - 89)  BP: 115/65 (16 Aug 2019 23:39) (96/54 - 148/65)  BP(mean): --  RR: 18 (16 Aug 2019 23:39) (18 - 20)  SpO2: 95% (16 Aug 2019 23:39) (92% - 97%)    I&O's Detail    16 Aug 2019 07:01  -  17 Aug 2019 06:10  --------------------------------------------------------  IN:  Total IN: 0 mL    OUT:    Voided: 650 mL    Voided: 1125 mL  Total OUT: 1775 mL    Total NET: -1775 mL        I&O's Summary    16 Aug 2019 07:01  -  17 Aug 2019 06:10  --------------------------------------------------------  IN: 0 mL / OUT: 1775 mL / NET: -1775 mL            PHYSICAL EXAM:  General: Appears well developed, well nourished, no acute distress  HEENT: Head: normocephalic, atraumatic  Eyes: Pupils equal and reactive  Neck: Supple, no carotid bruit, no JVD, no HJR  CARDIOVASCULAR: Normal S1 and S2, no murmur, rub, or gallop  LUNGS: Clear to auscultation bilaterally, no rales, rhonchi or wheeze  ABDOMEN: Soft, nontender, non-distended, positive bowel sounds, no mass or bruit  EXTREMITIES: No edema, distal pulses WNL  SKIN: Warm and dry with normal turgor  NEURO: Alert & oriented x 3, grossly intact  PSYCH: normal mood and affect    LABS:                        9.7    8.26  )-----------( 165      ( 16 Aug 2019 11:36 )             30.8     08-15    135  |  98  |  14.0  ----------------------------<  107  4.6   |  26.0  |  0.63    Ca    9.6      15 Aug 2019 20:48    TPro  7.3  /  Alb  3.9  /  TBili  0.5  /  DBili  x   /  AST  56<H>  /  ALT  23  /  AlkPhos  87  08-15    CARDIAC MARKERS ( 15 Aug 2019 20:48 )  x     / <0.01 ng/mL / x     / x     / x          PT/INR - ( 15 Aug 2019 20:48 )   PT: 13.1 sec;   INR: 1.13 ratio         PTT - ( 15 Aug 2019 20:48 )  PTT:24.9 sec    RADIOLOGY & ADDITIONAL STUDIES:    ECG: sinus no acute st/t    ECHO:    STRESS TEST:    CARDIAC CATHETERIZATION:    Assessment and Plan:  In summary, OSCAR ECHOLS is a 83y Male with past medical history significant for  AICD, HTN, HL, p/w dizziness - anemia /tarry stools - awaiting gi bleed  - will review office records  - aim for Hgb >8  - No active chest pain, evidence of ischemia, decompensated CHF, significant valvular abnormality, or unstable arrhythmia and therefore no absolute cardiac contraindication to the planned surgical procedure and this may be performed as scheduled.    We will follow with you.  Thank you for allowing me to participate in the care of your patient.      Sincerely,    Carlos Alberto Manzano MD
Patient is a 83y old  Male who presents with a chief complaint of dizziness and weakness.      HPI:  84 y/o male with PMH of HTN, HLD, chronic hernia, came to the ED complaining of dizziness and near syncope of 3 days duration. Patient noted he has been having dark stools for about 8 days now approximately 2 bowel movement per day and every episode is dark. He has no prior hx of anemia and has no nausea/vomiting, abdominal pain, chest pain, shortness of breath, palpitation, HA, LOC, recent travel, sick contact, hematuria, hematochezia. He has no prior h/o anemia and has had no previous EGD's or colonoscopies. He denies gross hematochezia or N/V or hematemesis or abdominal pain.      REVIEW OF SYSTEMS:  Constitutional: No fever but admits to fatigue and dizziness.  ENMT:  No difficulty hearing, tinnitus, vertigo; No sinus or throat pain  Respiratory: No cough, wheezing, chills or hemoptysis  Cardiovascular: No chest pain, palpitations, or leg swelling  Gastrointestinal: As noted above in HPI. No abdominal or epigastric pain. No nausea, vomiting or hematemesis; No diarrhea or constipation. No hematochezia.  Skin: No itching, burning, rashes or lesions   Musculoskeletal: No joint pain or swelling; No muscle, back or extremity pain  Patient has no cardiopulmonary, peripheral vascular, musculoskeletal, dermatological, neurological, or psychological symptoms or complaints at this time.    PAST MEDICAL & SURGICAL HISTORY:  Inguinal hernia  HLD (hyperlipidemia)  HTN (hypertension)  CAD s/p CABG      FAMILY HISTORY:  No pertinent family history in first degree relatives      SOCIAL HISTORY:  Smoking Status: [ ] Current, [ ] Former, [x ] Never  Pack Years: N/A. No ETOH or drug abuse history.    MEDICATIONS:  MEDICATIONS  (STANDING):  pantoprazole Infusion 8 mG/Hr (10 mL/Hr) IV Continuous <Continuous>    MEDICATIONS  (PRN):      Allergies    No Known Allergies    Intolerances        Vital Signs Last 24 Hrs  T(C): 36.7 (16 Aug 2019 05:08), Max: 37.1 (15 Aug 2019 18:14)  T(F): 98.1 (16 Aug 2019 05:08), Max: 98.7 (15 Aug 2019 18:14)  HR: 84 (16 Aug 2019 05:08) (82 - 101)  BP: 96/55 (16 Aug 2019 05:08) (88/45 - 103/60)  BP(mean): --  RR: 18 (16 Aug 2019 05:08) (16 - 18)  SpO2: 97% (16 Aug 2019 05:08) (97% - 100%)        PHYSICAL EXAM:    General: Well developed; well nourished; in no acute distress  HEENT: MMM, conjunctiva pink and sclera anicteric.  Lungs: Clear bilaterally.  Cor: RRR S1, S2 only. + sternotomy scar.  Gastrointestinal: Abdomen: Soft, non-tender non-distended; Normal bowel sounds; No rebound or guarding or HSM. + large right inguinal hernia, non tender, non incarcerated.  BARRETT: Decreased sphincter tone, Medium brown stool in rectal vault w/o blood or melena.  Extremities: Normal range of motion, No clubbing, cyanosis or edema  Neurological: Alert and oriented x3  Skin: Warm and dry. No obvious rash      LABS:                        7.9    8.21  )-----------( 208      ( 15 Aug 2019 20:48 )             25.7     08-15    135  |  98  |  14.0  ----------------------------<  107  4.6   |  26.0  |  0.63    Ca    9.6      15 Aug 2019 20:48    TPro  7.3  /  Alb  3.9  /  TBili  0.5  /  DBili  x   /  AST  56<H>  /  ALT  23  /  AlkPhos  87  08-15          RADIOLOGY & ADDITIONAL STUDIES:   CT of abdomen and pelvis with oral and IV contrast: + right inguinal hernia o/w unremarkable CT scan.

## 2019-08-17 NOTE — PROGRESS NOTE ADULT - SUBJECTIVE AND OBJECTIVE BOX
Pt seen and examined :F/U anemia, occult blood in stool.    This morning he has no complaints. Tolerating diet. He got 2 units blood and hgb up to 9.5. Iron studies c/w iron deficiency.    REVIEW OF SYSTEMS:    CONSTITUTIONAL: No fever, weight loss, or fatigue  EYES: No eye pain, visual disturbances, or discharge  ENMT:  No difficulty hearing, tinnitus, vertigo; No sinus or throat pain  RESPIRATORY: No cough, wheezing, chills or hemoptysis; No shortness of breath  CARDIOVASCULAR: No chest pain, palpitations, dizziness, or leg swelling  GASTROINTESTINAL: No abdominal or epigastric pain. No nausea, vomiting, or hematemesis; No diarrhea or constipation. No melena or hematochezia.    MEDICATIONS:  MEDICATIONS  (STANDING):  pantoprazole  Injectable 40 milliGRAM(s) IV Push every 12 hours  spironolactone 25 milliGRAM(s) Oral daily    MEDICATIONS  (PRN):      Allergies    No Known Allergies    Intolerances        Vital Signs Last 24 Hrs  T(C): 36.7 (17 Aug 2019 07:42), Max: 37 (16 Aug 2019 17:03)  T(F): 98.1 (17 Aug 2019 07:42), Max: 98.6 (16 Aug 2019 17:03)  HR: 77 (17 Aug 2019 07:42) (65 - 89)  BP: 93/56 (17 Aug 2019 07:42) (93/56 - 148/65)  BP(mean): --  RR: 20 (17 Aug 2019 07:42) (18 - 20)  SpO2: 95% (17 Aug 2019 06:08) (93% - 97%)    08-16 @ 07:01  -  08-17 @ 07:00  --------------------------------------------------------  IN: 400 mL / OUT: 2125 mL / NET: -1725 mL        PHYSICAL EXAM:    General: Well developed; well nourished; in no acute distress  HEENT: MMM, conjunctiva and sclera clear  Gastrointestinal:Abdomen: Soft non-tender non-distended; Normal bowel sounds; No hepatosplenomegaly  Extremities: no cyanosis, clubbing or edema.  Skin: Warm and dry. No obvious rash    LABS:      CBC Full  -  ( 17 Aug 2019 06:15 )  WBC Count : 7.90 K/uL  RBC Count : 3.10 M/uL  Hemoglobin : 9.5 g/dL  Hematocrit : 30.6 %  Platelet Count - Automated : 164 K/uL  Mean Cell Volume : 98.7 fl  Mean Cell Hemoglobin : 30.6 pg  Mean Cell Hemoglobin Concentration : 31.0 gm/dL  Auto Neutrophil # : 5.50 K/uL  Auto Lymphocyte # : 1.33 K/uL  Auto Monocyte # : 0.90 K/uL  Auto Eosinophil # : 0.12 K/uL  Auto Basophil # : 0.02 K/uL  Auto Neutrophil % : 69.6 %  Auto Lymphocyte % : 16.8 %  Auto Monocyte % : 11.4 %  Auto Eosinophil % : 1.5 %  Auto Basophil % : 0.3 %    08-17    137  |  100  |  11.0  ----------------------------<  81  3.6   |  25.0  |  0.61    Ca    8.6      17 Aug 2019 06:15    TPro  7.3  /  Alb  3.9  /  TBili  0.5  /  DBili  x   /  AST  56<H>  /  ALT  23  /  AlkPhos  87  08-15    PT/INR - ( 15 Aug 2019 20:48 )   PT: 13.1 sec;   INR: 1.13 ratio         PTT - ( 15 Aug 2019 20:48 )  PTT:24.9 sec Pt seen and examined :F/U anemia, occult blood in stool.    This morning he has no complaints. Tolerating diet. He got 2 units blood and hgb up to 9.5. Iron studies c/w iron deficiency. CT shows large right inguinal hernia and 3.4cm infrarenal AAA.    REVIEW OF SYSTEMS:    CONSTITUTIONAL: No fever, weight loss, or fatigue  EYES: No eye pain, visual disturbances, or discharge  ENMT:  No difficulty hearing, tinnitus, vertigo; No sinus or throat pain  RESPIRATORY: No cough, wheezing, chills or hemoptysis; No shortness of breath  CARDIOVASCULAR: No chest pain, palpitations, dizziness, or leg swelling  GASTROINTESTINAL: No abdominal or epigastric pain. No nausea, vomiting, or hematemesis; No diarrhea or constipation. No melena or hematochezia.    MEDICATIONS:  MEDICATIONS  (STANDING):  pantoprazole  Injectable 40 milliGRAM(s) IV Push every 12 hours  spironolactone 25 milliGRAM(s) Oral daily    MEDICATIONS  (PRN):      Allergies    No Known Allergies    Intolerances        Vital Signs Last 24 Hrs  T(C): 36.7 (17 Aug 2019 07:42), Max: 37 (16 Aug 2019 17:03)  T(F): 98.1 (17 Aug 2019 07:42), Max: 98.6 (16 Aug 2019 17:03)  HR: 77 (17 Aug 2019 07:42) (65 - 89)  BP: 93/56 (17 Aug 2019 07:42) (93/56 - 148/65)  BP(mean): --  RR: 20 (17 Aug 2019 07:42) (18 - 20)  SpO2: 95% (17 Aug 2019 06:08) (93% - 97%)    08-16 @ 07:01  -  08-17 @ 07:00  --------------------------------------------------------  IN: 400 mL / OUT: 2125 mL / NET: -1725 mL        PHYSICAL EXAM:    General: Well developed; well nourished; in no acute distress  HEENT: MMM, conjunctiva and sclera clear  Gastrointestinal:Abdomen: Soft non-tender non-distended; Normal bowel sounds; No hepatosplenomegaly  Extremities: no cyanosis, clubbing or edema.  Skin: Warm and dry. No obvious rash    LABS:      CBC Full  -  ( 17 Aug 2019 06:15 )  WBC Count : 7.90 K/uL  RBC Count : 3.10 M/uL  Hemoglobin : 9.5 g/dL  Hematocrit : 30.6 %  Platelet Count - Automated : 164 K/uL  Mean Cell Volume : 98.7 fl  Mean Cell Hemoglobin : 30.6 pg  Mean Cell Hemoglobin Concentration : 31.0 gm/dL  Auto Neutrophil # : 5.50 K/uL  Auto Lymphocyte # : 1.33 K/uL  Auto Monocyte # : 0.90 K/uL  Auto Eosinophil # : 0.12 K/uL  Auto Basophil # : 0.02 K/uL  Auto Neutrophil % : 69.6 %  Auto Lymphocyte % : 16.8 %  Auto Monocyte % : 11.4 %  Auto Eosinophil % : 1.5 %  Auto Basophil % : 0.3 %    08-17    137  |  100  |  11.0  ----------------------------<  81  3.6   |  25.0  |  0.61    Ca    8.6      17 Aug 2019 06:15    TPro  7.3  /  Alb  3.9  /  TBili  0.5  /  DBili  x   /  AST  56<H>  /  ALT  23  /  AlkPhos  87  08-15    PT/INR - ( 15 Aug 2019 20:48 )   PT: 13.1 sec;   INR: 1.13 ratio         PTT - ( 15 Aug 2019 20:48 )  PTT:24.9 sec        < from: CT Abdomen and Pelvis w/ Oral Cont and w/ IV Cont (08.16.19 @ 01:36) >   EXAM:  CT ABDOMEN AND PELVIS OC IC                          PROCEDURE DATE:  08/16/2019          INTERPRETATION:  CLINICAL INFORMATION: Black stool, inguinal hernia.    COMPARISON: 3/26/2010    PROCEDURE:   CT of the Abdomen and Pelvis was performed with intravenous contrast.   Intravenous contrast: 96 ml Omnipaque 300. 4 ml discarded.  Oral contrast: positive contrast was administered.  Sagittal and coronal reformats were performed.    FINDINGS:    LOWER CHEST: Emphysema. Scattered 2 mm nodules. Cardiomegaly. Cardiac   pacers, median sternotomy. Coronary artery atherosclerosis.    LIVER: Subcentimeter hypodensities too small to characterize.  BILE DUCTS: Normal caliber.  GALLBLADDER: Within normal limits.  SPLEEN: Within normal limits.  PANCREAS: Within normal limits.  ADRENALS: Within normal limits.  KIDNEYS/URETERS: Bilateral renal cysts. Left renal hypodensities too   small to characterize. No hydronephrosis.    BLADDER: Within normal limits.  REPRODUCTIVE ORGANS: Prostate within normal limits.    BOWEL: No bowel obstruction. Appendix within normal limits.  PERITONEUM: No ascites.  VESSELS: Calcified and noncalcified atherosclerotic disease of the aorta   and its branch vessels.  Aneurysmal dilatation of the infrarenal abdominal aorta measuring up to   3.4 cm.  RETROPERITONEUM/LYMPH NODES: No lymphadenopathy.    ABDOMINAL WALL: Large right inguinal hernia containing nonobstructed   loops of small bowel. Small fat-containing umbilical hernia.  BONES: Severe osteopenia. Mild to moderate compression deformity   throughout the visualized thoracolumbar spine.     IMPRESSION:       1. Large right inguinal hernia containing nonobstructed small bowel. No   evidence of small bowel obstruction. No acute findings to explain the   patient's symptoms.  2. New aneurysmal dilatation of infrarenal abdominal aorta up to 3.4 cm.   Extensive calcified and noncalcified atherosclerotic disease.  3. Emphysema. Scattered 2 mm lung nodules. Follow-up CT in 12 months is   recommended.                      < end of copied text >

## 2019-08-18 DIAGNOSIS — K40.90 UNILATERAL INGUINAL HERNIA, WITHOUT OBSTRUCTION OR GANGRENE, NOT SPECIFIED AS RECURRENT: ICD-10-CM

## 2019-08-18 LAB
ALBUMIN SERPL ELPH-MCNC: 3.2 G/DL — LOW (ref 3.3–5.2)
ALP SERPL-CCNC: 82 U/L — SIGNIFICANT CHANGE UP (ref 40–120)
ALT FLD-CCNC: 18 U/L — SIGNIFICANT CHANGE UP
ANION GAP SERPL CALC-SCNC: 14 MMOL/L — SIGNIFICANT CHANGE UP (ref 5–17)
AST SERPL-CCNC: 26 U/L — SIGNIFICANT CHANGE UP
BILIRUB DIRECT SERPL-MCNC: 0.2 MG/DL — SIGNIFICANT CHANGE UP (ref 0–0.3)
BILIRUB INDIRECT FLD-MCNC: 0.6 MG/DL — SIGNIFICANT CHANGE UP (ref 0.2–1)
BILIRUB SERPL-MCNC: 0.8 MG/DL — SIGNIFICANT CHANGE UP (ref 0.4–2)
BUN SERPL-MCNC: 18 MG/DL — SIGNIFICANT CHANGE UP (ref 8–20)
CALCIUM SERPL-MCNC: 8.7 MG/DL — SIGNIFICANT CHANGE UP (ref 8.6–10.2)
CHLORIDE SERPL-SCNC: 99 MMOL/L — SIGNIFICANT CHANGE UP (ref 98–107)
CO2 SERPL-SCNC: 26 MMOL/L — SIGNIFICANT CHANGE UP (ref 22–29)
CREAT SERPL-MCNC: 0.65 MG/DL — SIGNIFICANT CHANGE UP (ref 0.5–1.3)
GLUCOSE SERPL-MCNC: 98 MG/DL — SIGNIFICANT CHANGE UP (ref 70–115)
HCT VFR BLD CALC: 32.5 % — LOW (ref 39–50)
HGB BLD-MCNC: 10.1 G/DL — LOW (ref 13–17)
MCHC RBC-ENTMCNC: 30.7 PG — SIGNIFICANT CHANGE UP (ref 27–34)
MCHC RBC-ENTMCNC: 31.1 GM/DL — LOW (ref 32–36)
MCV RBC AUTO: 98.8 FL — SIGNIFICANT CHANGE UP (ref 80–100)
PLATELET # BLD AUTO: 183 K/UL — SIGNIFICANT CHANGE UP (ref 150–400)
POTASSIUM SERPL-MCNC: 4 MMOL/L — SIGNIFICANT CHANGE UP (ref 3.5–5.3)
POTASSIUM SERPL-SCNC: 4 MMOL/L — SIGNIFICANT CHANGE UP (ref 3.5–5.3)
PROT SERPL-MCNC: 6.2 G/DL — LOW (ref 6.6–8.7)
RBC # BLD: 3.29 M/UL — LOW (ref 4.2–5.8)
RBC # FLD: 17.8 % — HIGH (ref 10.3–14.5)
SODIUM SERPL-SCNC: 139 MMOL/L — SIGNIFICANT CHANGE UP (ref 135–145)
WBC # BLD: 8.83 K/UL — SIGNIFICANT CHANGE UP (ref 3.8–10.5)
WBC # FLD AUTO: 8.83 K/UL — SIGNIFICANT CHANGE UP (ref 3.8–10.5)

## 2019-08-18 PROCEDURE — 99232 SBSQ HOSP IP/OBS MODERATE 35: CPT

## 2019-08-18 RX ORDER — SOD SULF/SODIUM/NAHCO3/KCL/PEG
1000 SOLUTION, RECONSTITUTED, ORAL ORAL
Refills: 0 | Status: COMPLETED | OUTPATIENT
Start: 2019-08-18 | End: 2019-08-19

## 2019-08-18 RX ADMIN — Medication 1000 MILLILITER(S): at 17:26

## 2019-08-18 RX ADMIN — PANTOPRAZOLE SODIUM 40 MILLIGRAM(S): 20 TABLET, DELAYED RELEASE ORAL at 05:37

## 2019-08-18 RX ADMIN — PANTOPRAZOLE SODIUM 40 MILLIGRAM(S): 20 TABLET, DELAYED RELEASE ORAL at 17:26

## 2019-08-18 NOTE — PROGRESS NOTE ADULT - PROBLEM SELECTOR PLAN 1
for EGD/colonoscopy in AM. Cardiac clearance has been obtained. INR normal.
will plan for EGD and colonoscopy on Monday.

## 2019-08-18 NOTE — PROGRESS NOTE ADULT - SUBJECTIVE AND OBJECTIVE BOX
OSCARSAHN ECHOLS  ----------------------------------------  The patient was seen and evaluated for anemia.  The patient is in no acute distress.  Denied any chest pain, palpitations, dyspnea, or abdominal pain.  Offers no complaints.    Vital Signs Last 24 Hrs  T(C): 37.1 (18 Aug 2019 08:35), Max: 37.1 (18 Aug 2019 05:35)  T(F): 98.7 (18 Aug 2019 08:35), Max: 98.7 (18 Aug 2019 05:35)  HR: 92 (18 Aug 2019 08:35) (80 - 92)  BP: 99/61 (18 Aug 2019 08:35) (96/59 - 112/65)  BP(mean): --  RR: 20 (18 Aug 2019 08:35) (18 - 20)  SpO2: 94% (18 Aug 2019 05:35) (94% - 94%)    PHYSICAL EXAMINATION:  ----------------------------------------  General appearance: No acute distress, Awake, Alert  HEENT: Normocephalic, Atraumatic, Conjunctiva clear, EOMI  Neck: Supple, No JVD, No tenderness  Lungs: Breath sound equal bilaterally, No wheezes, No rales  Cardiovascular: S1S2, Regular rhythm  Abdomen: Soft, Nontender, Nondistended, No guarding/rebound, Positive bowel sounds  Extremities: No clubbing, No cyanosis, No edema, No calf tenderness  Neuro: Strength equal bilaterally, No tremors  Psychiatric: Appropriate mood, Normal affect    LABORATORY STUDIES:  ----------------------------------------             10.1   8.83  )-----------( 183      ( 18 Aug 2019 06:35 )             32.5     08-18    139  |  99  |  18.0  ----------------------------<  98  4.0   |  26.0  |  0.65    Ca    8.7      18 Aug 2019 06:35    TPro  6.2<L>  /  Alb  3.2<L>  /  TBili  0.8  /  DBili  0.2  /  AST  26  /  ALT  18  /  AlkPhos  82  08-18    LIVER FUNCTIONS - ( 18 Aug 2019 06:35 )  Alb: 3.2 g/dL / Pro: 6.2 g/dL / ALK PHOS: 82 U/L / ALT: 18 U/L / AST: 26 U/L / GGT: x           MEDICATIONS  (STANDING):  pantoprazole  Injectable 40 milliGRAM(s) IV Push every 12 hours  polyethylene glycol/electrolyte Solution 1000 milliLiter(s) Oral two times a day  spironolactone 25 milliGRAM(s) Oral daily      ASSESSMENT / PLAN:  ----------------------------------------  82 y/o male with PMH of HTN, HLD, chronic hernia, came to the ED complaining of dizziness and near syncope of 3 days duration. Patient noted he has been having dark stools for about 8 days now approximately 2 bowel movement per day and every episode is dark. He has no prior hx; has never done colonoscopy in the past.     Acute blood loss anemia / Gastrointestinal hemorrhage - Status post prior transfusion of packed red blood cells. On intravenous pantoprazole. For endoscopic evaluation tomorrow. Cardiology consultation noted. Blood count stable with slight improvement.    Hypertension - Close blood pressure monitoring. On intravenous fluids for hypotension.    Coronary artery disease - For resumption of aspirin when feasible.    Inguinal hernia - No obstruction noted.

## 2019-08-18 NOTE — PROGRESS NOTE ADULT - SUBJECTIVE AND OBJECTIVE BOX
[facility], [facility]                                                    [address], [address], [place] [address]                                                         PHONE: [phone]    FAX: [phone] [address], [address], [place] [address]                                                 PHONE: [phone]    FAX: [phone]  *******************************************************************************    Overnight events/Subjective Assessment:    INTERPRETATION OF TELEMETRY (personally reviewed):    No Known Allergies    MEDICATIONS  (STANDING):  pantoprazole  Injectable 40 milliGRAM(s) IV Push every 12 hours  spironolactone 25 milliGRAM(s) Oral daily    MEDICATIONS  (PRN):      Vital Signs Last 24 Hrs  T(C): 37.1 (18 Aug 2019 08:35), Max: 37.1 (18 Aug 2019 05:35)  T(F): 98.7 (18 Aug 2019 08:35), Max: 98.7 (18 Aug 2019 05:35)  HR: 92 (18 Aug 2019 08:35) (80 - 92)  BP: 99/61 (18 Aug 2019 08:35) (96/59 - 112/65)  BP(mean): --  RR: 20 (18 Aug 2019 08:35) (18 - 20)  SpO2: 94% (18 Aug 2019 05:35) (94% - 94%)    I&O's Detail    17 Aug 2019 07:01  -  18 Aug 2019 07:00  --------------------------------------------------------  IN:  Total IN: 0 mL    OUT:    Voided: 700 mL  Total OUT: 700 mL    Total NET: -700 mL        I&O's Summary    17 Aug 2019 07:01  -  18 Aug 2019 07:00  --------------------------------------------------------  IN: 0 mL / OUT: 700 mL / NET: -700 mL            PHYSICAL EXAM:  General: Appears well developed, well nourished, no acute distress  HEENT: Head: normocephalic, atraumatic  Eyes: Pupils equal and reactive  Neck: Supple, no carotid bruit, no JVD, no HJR  CARDIOVASCULAR: Normal S1 and S2, no murmur, rub, or gallop  LUNGS: Clear to auscultation bilaterally, no rales, rhonchi or wheeze  ABDOMEN: Soft, nontender, non-distended, positive bowel sounds, no mass or bruit  EXTREMITIES: No edema, distal pulses WNL  SKIN: Warm and dry with normal turgor  NEURO: Alert & oriented x 3, grossly intact  PSYCH: normal mood and affect        LABS:                        10.1   8.83  )-----------( 183      ( 18 Aug 2019 06:35 )             32.5     08-18    139  |  99  |  18.0  ----------------------------<  98  4.0   |  26.0  |  0.65    Ca    8.7      18 Aug 2019 06:35    TPro  6.2<L>  /  Alb  3.2<L>  /  TBili  0.8  /  DBili  0.2  /  AST  26  /  ALT  18  /  AlkPhos  82  08-18          serum  Lipids:         RADIOLOGY & ADDITIONAL STUDIES:    ECG:    ECHO:    STRESS TEST:    CARDIAC CATHETERIZATION:    ASSESSMENT AND PLAN:  In summary, OSCAR ECHOLS is a 83y Male with past medical history significant for htn hl isch cm CABG 2004,  aicd tarry stool awaiting GI eval  - may proceed with GI eval  - last stress test 5/2019 no new ischemia, old mi  - asa was held but this needs to be restarted ASAP post GI w/u with hx isch cm      Carlos Alberto Zapata, DO

## 2019-08-18 NOTE — PROGRESS NOTE ADULT - SUBJECTIVE AND OBJECTIVE BOX
Pt seen and examined :F/U anemia, occult blood in stool, iron deficiency    This morning he feels well with no complaints. Tolerating diet. H/H is up.    REVIEW OF SYSTEMS:    CONSTITUTIONAL: No fever, weight loss, or fatigue  EYES: No eye pain, visual disturbances, or discharge  ENMT:  No difficulty hearing, tinnitus, vertigo; No sinus or throat pain  RESPIRATORY: No cough, wheezing, chills or hemoptysis; No shortness of breath  CARDIOVASCULAR: No chest pain, palpitations, dizziness, or leg swelling  GASTROINTESTINAL: No abdominal or epigastric pain. No nausea, vomiting, or hematemesis; No diarrhea or constipation. No melena or hematochezia.    MEDICATIONS:  MEDICATIONS  (STANDING):  pantoprazole  Injectable 40 milliGRAM(s) IV Push every 12 hours  polyethylene glycol/electrolyte Solution 1000 milliLiter(s) Oral two times a day  spironolactone 25 milliGRAM(s) Oral daily    MEDICATIONS  (PRN):      Allergies    No Known Allergies    Intolerances        Vital Signs Last 24 Hrs  T(C): 37.1 (18 Aug 2019 08:35), Max: 37.1 (18 Aug 2019 05:35)  T(F): 98.7 (18 Aug 2019 08:35), Max: 98.7 (18 Aug 2019 05:35)  HR: 92 (18 Aug 2019 08:35) (80 - 92)  BP: 99/61 (18 Aug 2019 08:35) (96/59 - 112/65)  BP(mean): --  RR: 20 (18 Aug 2019 08:35) (18 - 20)  SpO2: 94% (18 Aug 2019 05:35) (94% - 94%)    08-17 @ 07:01  -  08-18 @ 07:00  --------------------------------------------------------  IN: 0 mL / OUT: 700 mL / NET: -700 mL        PHYSICAL EXAM:    General: Well developed; well nourished; in no acute distress  HEENT: MMM, conjunctiva and sclera clear, bilateral arcus senilis  Gastrointestinal:Abdomen: Soft non-tender non-distended; Normal bowel sounds; No hepatosplenomegaly  Extremities: no cyanosis, clubbing or edema.  Skin: Warm and dry. No obvious rash    LABS:      CBC Full  -  ( 18 Aug 2019 06:35 )  WBC Count : 8.83 K/uL  RBC Count : 3.29 M/uL  Hemoglobin : 10.1 g/dL  Hematocrit : 32.5 %  Platelet Count - Automated : 183 K/uL  Mean Cell Volume : 98.8 fl  Mean Cell Hemoglobin : 30.7 pg  Mean Cell Hemoglobin Concentration : 31.1 gm/dL  Auto Neutrophil # : x  Auto Lymphocyte # : x  Auto Monocyte # : x  Auto Eosinophil # : x  Auto Basophil # : x  Auto Neutrophil % : x  Auto Lymphocyte % : x  Auto Monocyte % : x  Auto Eosinophil % : x  Auto Basophil % : x    08-18    139  |  99  |  18.0  ----------------------------<  98  4.0   |  26.0  |  0.65    Ca    8.7      18 Aug 2019 06:35    TPro  6.2<L>  /  Alb  3.2<L>  /  TBili  0.8  /  DBili  0.2  /  AST  26  /  ALT  18  /  AlkPhos  82  08-18 Pt seen and examined :F/U anemia, occult blood in stool, iron deficiency    This morning he feels well with no complaints. Tolerating diet. H/H is up. Cat scan showed right inguinal hernia and 3.4cm infrarenal AAA.    REVIEW OF SYSTEMS:    CONSTITUTIONAL: No fever, weight loss, or fatigue  EYES: No eye pain, visual disturbances, or discharge  ENMT:  No difficulty hearing, tinnitus, vertigo; No sinus or throat pain  RESPIRATORY: No cough, wheezing, chills or hemoptysis; No shortness of breath  CARDIOVASCULAR: No chest pain, palpitations, dizziness, or leg swelling  GASTROINTESTINAL: No abdominal or epigastric pain. No nausea, vomiting, or hematemesis; No diarrhea or constipation. No melena or hematochezia.    MEDICATIONS:  MEDICATIONS  (STANDING):  pantoprazole  Injectable 40 milliGRAM(s) IV Push every 12 hours  polyethylene glycol/electrolyte Solution 1000 milliLiter(s) Oral two times a day  spironolactone 25 milliGRAM(s) Oral daily    MEDICATIONS  (PRN):      Allergies    No Known Allergies    Intolerances        Vital Signs Last 24 Hrs  T(C): 37.1 (18 Aug 2019 08:35), Max: 37.1 (18 Aug 2019 05:35)  T(F): 98.7 (18 Aug 2019 08:35), Max: 98.7 (18 Aug 2019 05:35)  HR: 92 (18 Aug 2019 08:35) (80 - 92)  BP: 99/61 (18 Aug 2019 08:35) (96/59 - 112/65)  BP(mean): --  RR: 20 (18 Aug 2019 08:35) (18 - 20)  SpO2: 94% (18 Aug 2019 05:35) (94% - 94%)    08-17 @ 07:01  -  08-18 @ 07:00  --------------------------------------------------------  IN: 0 mL / OUT: 700 mL / NET: -700 mL        PHYSICAL EXAM:    General: Well developed; well nourished; in no acute distress  HEENT: MMM, conjunctiva and sclera clear, bilateral arcus senilis  Gastrointestinal:Abdomen: Soft non-tender non-distended; Normal bowel sounds; No hepatosplenomegaly  Extremities: no cyanosis, clubbing or edema.  Skin: Warm and dry. No obvious rash    LABS:      CBC Full  -  ( 18 Aug 2019 06:35 )  WBC Count : 8.83 K/uL  RBC Count : 3.29 M/uL  Hemoglobin : 10.1 g/dL  Hematocrit : 32.5 %  Platelet Count - Automated : 183 K/uL  Mean Cell Volume : 98.8 fl  Mean Cell Hemoglobin : 30.7 pg  Mean Cell Hemoglobin Concentration : 31.1 gm/dL  Auto Neutrophil # : x  Auto Lymphocyte # : x  Auto Monocyte # : x  Auto Eosinophil # : x  Auto Basophil # : x  Auto Neutrophil % : x  Auto Lymphocyte % : x  Auto Monocyte % : x  Auto Eosinophil % : x  Auto Basophil % : x    08-18    139  |  99  |  18.0  ----------------------------<  98  4.0   |  26.0  |  0.65    Ca    8.7      18 Aug 2019 06:35    TPro  6.2<L>  /  Alb  3.2<L>  /  TBili  0.8  /  DBili  0.2  /  AST  26  /  ALT  18  /  AlkPhos  82  08-18

## 2019-08-19 ENCOUNTER — RESULT REVIEW (OUTPATIENT)
Age: 84
End: 2019-08-19

## 2019-08-19 LAB
ANION GAP SERPL CALC-SCNC: 14 MMOL/L — SIGNIFICANT CHANGE UP (ref 5–17)
BUN SERPL-MCNC: 17 MG/DL — SIGNIFICANT CHANGE UP (ref 8–20)
CALCIUM SERPL-MCNC: 9.1 MG/DL — SIGNIFICANT CHANGE UP (ref 8.6–10.2)
CHLORIDE SERPL-SCNC: 106 MMOL/L — SIGNIFICANT CHANGE UP (ref 98–107)
CO2 SERPL-SCNC: 24 MMOL/L — SIGNIFICANT CHANGE UP (ref 22–29)
CREAT SERPL-MCNC: 0.58 MG/DL — SIGNIFICANT CHANGE UP (ref 0.5–1.3)
GLUCOSE SERPL-MCNC: 93 MG/DL — SIGNIFICANT CHANGE UP (ref 70–115)
HCT VFR BLD CALC: 34.4 % — LOW (ref 39–50)
HGB BLD-MCNC: 10.4 G/DL — LOW (ref 13–17)
INR BLD: 1.19 RATIO — HIGH (ref 0.88–1.16)
MCHC RBC-ENTMCNC: 30.2 GM/DL — LOW (ref 32–36)
MCHC RBC-ENTMCNC: 30.3 PG — SIGNIFICANT CHANGE UP (ref 27–34)
MCV RBC AUTO: 100.3 FL — HIGH (ref 80–100)
PLATELET # BLD AUTO: 177 K/UL — SIGNIFICANT CHANGE UP (ref 150–400)
POTASSIUM SERPL-MCNC: 3.7 MMOL/L — SIGNIFICANT CHANGE UP (ref 3.5–5.3)
POTASSIUM SERPL-SCNC: 3.7 MMOL/L — SIGNIFICANT CHANGE UP (ref 3.5–5.3)
PROTHROM AB SERPL-ACNC: 13.7 SEC — HIGH (ref 10–12.9)
RBC # BLD: 3.43 M/UL — LOW (ref 4.2–5.8)
RBC # FLD: 17.6 % — HIGH (ref 10.3–14.5)
SODIUM SERPL-SCNC: 144 MMOL/L — SIGNIFICANT CHANGE UP (ref 135–145)
WBC # BLD: 7.05 K/UL — SIGNIFICANT CHANGE UP (ref 3.8–10.5)
WBC # FLD AUTO: 7.05 K/UL — SIGNIFICANT CHANGE UP (ref 3.8–10.5)

## 2019-08-19 PROCEDURE — 45378 DIAGNOSTIC COLONOSCOPY: CPT | Mod: 59

## 2019-08-19 PROCEDURE — 43239 EGD BIOPSY SINGLE/MULTIPLE: CPT

## 2019-08-19 PROCEDURE — 99232 SBSQ HOSP IP/OBS MODERATE 35: CPT

## 2019-08-19 PROCEDURE — 88305 TISSUE EXAM BY PATHOLOGIST: CPT | Mod: 26

## 2019-08-19 PROCEDURE — 88342 IMHCHEM/IMCYTCHM 1ST ANTB: CPT | Mod: 26

## 2019-08-19 RX ORDER — PANTOPRAZOLE SODIUM 20 MG/1
40 TABLET, DELAYED RELEASE ORAL EVERY 12 HOURS
Refills: 0 | Status: DISCONTINUED | OUTPATIENT
Start: 2019-08-19 | End: 2019-08-20

## 2019-08-19 RX ORDER — TAMSULOSIN HYDROCHLORIDE 0.4 MG/1
0.4 CAPSULE ORAL AT BEDTIME
Refills: 0 | Status: DISCONTINUED | OUTPATIENT
Start: 2019-08-19 | End: 2019-08-20

## 2019-08-19 RX ORDER — ATORVASTATIN CALCIUM 80 MG/1
10 TABLET, FILM COATED ORAL AT BEDTIME
Refills: 0 | Status: DISCONTINUED | OUTPATIENT
Start: 2019-08-19 | End: 2019-08-20

## 2019-08-19 RX ADMIN — PANTOPRAZOLE SODIUM 40 MILLIGRAM(S): 20 TABLET, DELAYED RELEASE ORAL at 05:24

## 2019-08-19 RX ADMIN — TAMSULOSIN HYDROCHLORIDE 0.4 MILLIGRAM(S): 0.4 CAPSULE ORAL at 21:13

## 2019-08-19 RX ADMIN — PANTOPRAZOLE SODIUM 40 MILLIGRAM(S): 20 TABLET, DELAYED RELEASE ORAL at 17:28

## 2019-08-19 RX ADMIN — ATORVASTATIN CALCIUM 10 MILLIGRAM(S): 80 TABLET, FILM COATED ORAL at 21:13

## 2019-08-19 NOTE — PROGRESS NOTE ADULT - SUBJECTIVE AND OBJECTIVE BOX
OSCAR ECHOLS  ----------------------------------------  The patient was seen and evaluated for anemia.  The patient is in no acute distress.  Denied any chest pain, palpitations, dyspnea, or abdominal pain.  Offers no complaints.    Vital Signs Last 24 Hrs  T(C): 36.6 (19 Aug 2019 08:08), Max: 37.1 (18 Aug 2019 08:35)  T(F): 97.9 (19 Aug 2019 08:08), Max: 98.7 (18 Aug 2019 08:35)  HR: 86 (19 Aug 2019 08:08) (57 - 92)  BP: 100/63 (19 Aug 2019 08:08) (93/58 - 115/74)  BP(mean): --  RR: 20 (19 Aug 2019 08:08) (18 - 20)  SpO2: 94% (19 Aug 2019 05:23) (93% - 95%)    PHYSICAL EXAMINATION:  ----------------------------------------  General appearance: No acute distress, Awake, Alert  HEENT: Normocephalic, Atraumatic, Conjunctiva clear, EOMI  Neck: Supple, No JVD, No tenderness  Lungs: Breath sound equal bilaterally, No wheezes, No rales  Cardiovascular: S1S2, Regular rhythm  Abdomen: Soft, Nontender, Nondistended, No guarding/rebound, Positive bowel sounds  Extremities: No clubbing, No cyanosis, No edema, No calf tenderness  Neuro: Strength equal bilaterally, No tremors  Psychiatric: Appropriate mood, Normal affect    LABORATORY STUDIES:  ----------------------------------------             10.4   7.05  )-----------( 177      ( 19 Aug 2019 07:31 )             34.4     08-19    144  |  106  |  17.0  ----------------------------<  93  3.7   |  24.0  |  0.58    Ca    9.1      19 Aug 2019 07:31    TPro  6.2<L>  /  Alb  3.2<L>  /  TBili  0.8  /  DBili  0.2  /  AST  26  /  ALT  18  /  AlkPhos  82  08-18    LIVER FUNCTIONS - ( 18 Aug 2019 06:35 )  Alb: 3.2 g/dL / Pro: 6.2 g/dL / ALK PHOS: 82 U/L / ALT: 18 U/L / AST: 26 U/L / GGT: x           PT/INR - ( 19 Aug 2019 07:31 )   PT: 13.7 sec;   INR: 1.19 ratio      MEDICATIONS  (STANDING):  atorvastatin 10 milliGRAM(s) Oral at bedtime  pantoprazole  Injectable 40 milliGRAM(s) IV Push every 12 hours  spironolactone 25 milliGRAM(s) Oral daily  tamsulosin 0.4 milliGRAM(s) Oral at bedtime      ASSESSMENT / PLAN:  ----------------------------------------  82 y/o male with PMH of HTN, HLD, chronic hernia, came to the ED complaining of dizziness and near syncope of 3 days duration. Patient noted he has been having dark stools for about 8 days now approximately 2 bowel movement per day and every episode is dark. He has no prior hx; has never done colonoscopy in the past.     Acute blood loss anemia / Gastrointestinal hemorrhage - Status post prior transfusion of packed red blood cells with improvement in blood count. On intravenous pantoprazole. For endoscopic evaluation today. Cardiology consultation noted.    Hypertension - Close blood pressure monitoring. On intravenous fluids for hypotension.    Coronary artery disease - For resumption of aspirin when feasible.    Inguinal hernia - No obstruction noted.

## 2019-08-19 NOTE — BRIEF OPERATIVE NOTE - OPERATION/FINDINGS
EGD: Erosive antral gastritis. Clean based two 1 cm duodenal bulb ulcers. Bulbar duodenitis  Colonoscopy: Normal colonoscopy except internal hemorrhoids

## 2019-08-19 NOTE — BRIEF OPERATIVE NOTE - NSICDXBRIEFPROCEDURE_GEN_ALL_CORE_FT
PROCEDURES:  Esophagogastroduodenoscopy (EGD) with biopsy with colonoscopy 19-Aug-2019 12:22:35  Laith Johnson

## 2019-08-19 NOTE — BRIEF OPERATIVE NOTE - NSICDXBRIEFPOSTOP_GEN_ALL_CORE_FT
POST-OP DIAGNOSIS:  Internal hemorrhoids 19-Aug-2019 12:23:30  Laith Johnson  Gastritis 19-Aug-2019 12:23:23  Laith Johnson  Duodenal ulcer 19-Aug-2019 12:23:16  Laith Johnson

## 2019-08-20 ENCOUNTER — TRANSCRIPTION ENCOUNTER (OUTPATIENT)
Age: 84
End: 2019-08-20

## 2019-08-20 VITALS
DIASTOLIC BLOOD PRESSURE: 44 MMHG | OXYGEN SATURATION: 93 % | RESPIRATION RATE: 18 BRPM | TEMPERATURE: 98 F | HEART RATE: 80 BPM | SYSTOLIC BLOOD PRESSURE: 91 MMHG

## 2019-08-20 PROCEDURE — 99232 SBSQ HOSP IP/OBS MODERATE 35: CPT

## 2019-08-20 PROCEDURE — 80076 HEPATIC FUNCTION PANEL: CPT

## 2019-08-20 PROCEDURE — 83550 IRON BINDING TEST: CPT

## 2019-08-20 PROCEDURE — 86850 RBC ANTIBODY SCREEN: CPT

## 2019-08-20 PROCEDURE — 70450 CT HEAD/BRAIN W/O DYE: CPT

## 2019-08-20 PROCEDURE — 96360 HYDRATION IV INFUSION INIT: CPT | Mod: XU

## 2019-08-20 PROCEDURE — 93005 ELECTROCARDIOGRAM TRACING: CPT

## 2019-08-20 PROCEDURE — 74177 CT ABD & PELVIS W/CONTRAST: CPT

## 2019-08-20 PROCEDURE — 88305 TISSUE EXAM BY PATHOLOGIST: CPT

## 2019-08-20 PROCEDURE — 80053 COMPREHEN METABOLIC PANEL: CPT

## 2019-08-20 PROCEDURE — 82607 VITAMIN B-12: CPT

## 2019-08-20 PROCEDURE — 36430 TRANSFUSION BLD/BLD COMPNT: CPT

## 2019-08-20 PROCEDURE — 99239 HOSP IP/OBS DSCHRG MGMT >30: CPT

## 2019-08-20 PROCEDURE — 84484 ASSAY OF TROPONIN QUANT: CPT

## 2019-08-20 PROCEDURE — 71045 X-RAY EXAM CHEST 1 VIEW: CPT

## 2019-08-20 PROCEDURE — 80048 BASIC METABOLIC PNL TOTAL CA: CPT

## 2019-08-20 PROCEDURE — 82746 ASSAY OF FOLIC ACID SERUM: CPT

## 2019-08-20 PROCEDURE — 86901 BLOOD TYPING SEROLOGIC RH(D): CPT

## 2019-08-20 PROCEDURE — 84466 ASSAY OF TRANSFERRIN: CPT

## 2019-08-20 PROCEDURE — 85730 THROMBOPLASTIN TIME PARTIAL: CPT

## 2019-08-20 PROCEDURE — 86923 COMPATIBILITY TEST ELECTRIC: CPT

## 2019-08-20 PROCEDURE — 85610 PROTHROMBIN TIME: CPT

## 2019-08-20 PROCEDURE — P9016: CPT

## 2019-08-20 PROCEDURE — 82728 ASSAY OF FERRITIN: CPT

## 2019-08-20 PROCEDURE — 82272 OCCULT BLD FECES 1-3 TESTS: CPT

## 2019-08-20 PROCEDURE — 88342 IMHCHEM/IMCYTCHM 1ST ANTB: CPT

## 2019-08-20 PROCEDURE — 86900 BLOOD TYPING SEROLOGIC ABO: CPT

## 2019-08-20 PROCEDURE — 85027 COMPLETE CBC AUTOMATED: CPT

## 2019-08-20 PROCEDURE — 99284 EMERGENCY DEPT VISIT MOD MDM: CPT | Mod: 25

## 2019-08-20 PROCEDURE — 83540 ASSAY OF IRON: CPT

## 2019-08-20 PROCEDURE — 36415 COLL VENOUS BLD VENIPUNCTURE: CPT

## 2019-08-20 RX ORDER — PANTOPRAZOLE SODIUM 20 MG/1
1 TABLET, DELAYED RELEASE ORAL
Qty: 60 | Refills: 0
Start: 2019-08-20 | End: 2019-09-18

## 2019-08-20 RX ADMIN — PANTOPRAZOLE SODIUM 40 MILLIGRAM(S): 20 TABLET, DELAYED RELEASE ORAL at 05:20

## 2019-08-20 NOTE — DISCHARGE NOTE PROVIDER - NSDCCPCAREPLAN_GEN_ALL_CORE_FT
PRINCIPAL DISCHARGE DIAGNOSIS  Diagnosis: GI bleed  Assessment and Plan of Treatment: Cotninue on pantoprazole. Follow up with Gastroenterologyu for furtherm management and biopsy results.      SECONDARY DISCHARGE DIAGNOSES  Diagnosis: CAD (coronary artery disease)  Assessment and Plan of Treatment: Continue on your home medications. Follow up with your cardiologist for further management.

## 2019-08-20 NOTE — PROGRESS NOTE ADULT - ATTENDING COMMENTS
Estimate date of discharge 8/19/19
Estimate date of discharge 8/19/19 - 8/20/19
Estimate date of discharge 8/20/19
Estimate date of discharge 8/19/19
Estimate date of discharge 8/19/19

## 2019-08-20 NOTE — DISCHARGE NOTE PROVIDER - CARE PROVIDERS DIRECT ADDRESSES
,DirectAddress_Unknown,[email or web address]

## 2019-08-20 NOTE — PROGRESS NOTE ADULT - SUBJECTIVE AND OBJECTIVE BOX
Patient seen and examined; chart reviewed.  Tolerated both procedures well yesterday.  Offers no complaints.  no fever or abdominal pain.  No BM and no bleeding.  Tolerated clear liquid diet OK.    EGD:  2 large DU's s [facility].  Gastric biopsies done to exclude HP.  No path yet.  Rest of EGD was negative.  Colonoscopy was negative except for internal hemorrhoids.  Not actively bleeding.    NILTON and severe anemia could easily be the result of the DU's.  OK for advancement of diet to DASH and Mobilize.  As pt is chronically on Aspirin, PPI medication will need to be taken indefinitely, as long as aspirin continues.    GI office follow up c Dr AGUAYO upon discharge for Path check.      PAST MEDICAL & SURGICAL HISTORY:  Inguinal hernia  HLD (hyperlipidemia)  HTN (hypertension)      ROS:  No Heartburn, regurgitation, dysphagia, odynophagia.  No dyspepsia  No abdominal pain.    No Nausea, vomiting.  No Bleeding.  No hematemesis.   No diarrhea.    No hematochesia.  No weight loss, anorexia.  No edema.      MEDICATIONS  (STANDING):  atorvastatin 10 milliGRAM(s) Oral at bedtime  pantoprazole    Tablet 40 milliGRAM(s) Oral every 12 hours  spironolactone 25 milliGRAM(s) Oral daily  tamsulosin 0.4 milliGRAM(s) Oral at bedtime    MEDICATIONS  (PRN):      Allergies    No Known Allergies    Intolerances        Vital Signs Last 24 Hrs  T(C): 36.8 (20 Aug 2019 08:00), Max: 36.8 (20 Aug 2019 08:00)  T(F): 98.2 (20 Aug 2019 08:00), Max: 98.2 (20 Aug 2019 08:00)  HR: 80 (20 Aug 2019 08:00) (77 - 86)  BP: 91/44 (20 Aug 2019 08:00) (91/44 - 104/60)  BP(mean): --  RR: 18 (20 Aug 2019 08:00) (18 - 18)  SpO2: 93% (20 Aug 2019 08:00) (93% - 97%)    PHYSICAL EXAM:    GENERAL: NAD, well-groomed, well-developed  HEAD:  Atraumatic, Normocephalic  EYES: EOMI, PERRLA, conjunctiva and sclera clear  ENMT: No tonsillar erythema, exudates, or enlargement; Moist mucous membranes, Good dentition, No lesions  NECK: Supple, No JVD, Normal thyroid  CHEST/LUNG: Clear to percussion bilaterally; No rales, rhonchi, wheezing, or rubs  HEART: Regular rate and rhythm; No murmurs, rubs, or gallops  ABDOMEN: Soft, Nontender, Nondistended; Bowel sounds present  EXTREMITIES:  2+ Peripheral Pulses, No clubbing, cyanosis, or edema  LYMPH: No lymphadenopathy noted  SKIN: No rashes or lesions      LABS:                        10.4   7.05  )-----------( 177      ( 19 Aug 2019 07:31 )             34.4     08-19    144  |  106  |  17.0  ----------------------------<  93  3.7   |  24.0  |  0.58    Ca    9.1      19 Aug 2019 07:31      PT/INR - ( 19 Aug 2019 07:31 )   PT: 13.7 sec;   INR: 1.19 ratio                  RADIOLOGY & ADDITIONAL STUDIES:

## 2019-08-20 NOTE — DISCHARGE NOTE NURSING/CASE MANAGEMENT/SOCIAL WORK - NSDCDPATPORTLINK_GEN_ALL_CORE
You can access the [facility], offered by [facility], by registering with the following website: [email or web address]

## 2019-08-20 NOTE — PROGRESS NOTE ADULT - ASSESSMENT
NILTON Recent Melena could easily have been from the duodenal ulcer disease.  As pt has been chronically on Aspirin then PPI use should continue as long as the aspirin continues.  Further work up not indicated.    Diet advanced to DASH.  Once tolerated OK for DC from GI POV.    GI office follow up c Dr AGUAYO post discharge for path check.

## 2019-08-20 NOTE — PROGRESS NOTE ADULT - PROVIDER SPECIALTY LIST ADULT
Cardiology
Gastroenterology
Gastroenterology
Hospitalist
Gastroenterology

## 2019-08-20 NOTE — PROGRESS NOTE ADULT - SUBJECTIVE AND OBJECTIVE BOX
OSCAR ECHOLS  ----------------------------------------  The patient was seen and evaluated for anemia.  The patient is in no acute distress.  Denied any chest pain, palpitations, dyspnea, or abdominal pain.  Offers no complaints.    Vital Signs Last 24 Hrs  T(C): 36.8 (20 Aug 2019 08:00), Max: 36.8 (20 Aug 2019 08:00)  T(F): 98.2 (20 Aug 2019 08:00), Max: 98.2 (20 Aug 2019 08:00)  HR: 80 (20 Aug 2019 08:00) (77 - 86)  BP: 91/44 (20 Aug 2019 08:00) (91/44 - 104/60)  BP(mean): --  RR: 18 (20 Aug 2019 08:00) (18 - 18)  SpO2: 93% (20 Aug 2019 08:00) (93% - 97%)    PHYSICAL EXAMINATION:  ----------------------------------------  General appearance: No acute distress, Awake, Alert  HEENT: Normocephalic, Atraumatic, Conjunctiva clear, EOMI  Neck: Supple, No JVD, No tenderness  Lungs: Breath sound equal bilaterally, No wheezes, No rales  Cardiovascular: S1S2, Regular rhythm  Abdomen: Soft, Nontender, Nondistended, No guarding/rebound, Positive bowel sounds  Extremities: No clubbing, No cyanosis, No edema, No calf tenderness  Neuro: Strength equal bilaterally, No tremors  Psychiatric: Appropriate mood, Normal affect    LABORATORY STUDIES:  ----------------------------------------             10.4   7.05  )-----------( 177      ( 19 Aug 2019 07:31 )             34.4     08-19    144  |  106  |  17.0  ----------------------------<  93  3.7   |  24.0  |  0.58    Ca    9.1      19 Aug 2019 07:31    PT/INR - ( 19 Aug 2019 07:31 )   PT: 13.7 sec;   INR: 1.19 ratio      MEDICATIONS  (STANDING):  atorvastatin 10 milliGRAM(s) Oral at bedtime  pantoprazole    Tablet 40 milliGRAM(s) Oral every 12 hours  spironolactone 25 milliGRAM(s) Oral daily  tamsulosin 0.4 milliGRAM(s) Oral at bedtime      ASSESSMENT / PLAN:  ----------------------------------------  82 y/o male with PMH of HTN, HLD, chronic hernia, came to the ED complaining of dizziness and near syncope of 3 days duration. Patient noted he has been having dark stools for about 8 days now approximately 2 bowel movement per day and every episode is dark. He has no prior hx; has never done colonoscopy in the past.     Acute blood loss anemia / Gastrointestinal hemorrhage - Status post prior transfusion of packed red blood cells with improvement in blood count. On intravenous pantoprazole. Endoscopy noted duodenal ulcers. Tolerated oral intake.    Hypertension - Close blood pressure monitoring.    Coronary artery disease - For resumption of aspirin and continuation of pantoprazole.    Inguinal hernia - No obstruction noted.    Discussed with the patient and his son at the bedside.

## 2019-08-20 NOTE — DISCHARGE NOTE PROVIDER - PROVIDER TOKENS
PROVIDER:[TOKEN:[[id number]],FOLLOWUP:[2 weeks]],PROVIDER:[TOKEN:[[id number]],FOLLOWUP:[2 weeks]]

## 2019-08-20 NOTE — DISCHARGE NOTE PROVIDER - HOSPITAL COURSE
83M presented with episodes of dizziness and near syncope. He had noted passing dark stools for the past eight days. On presentation, H/H(7.9/25.7). Fecal occult blood testing was noted to be positive. CT of the head noted no intracranial hemorrhage, mass, or shift of the midline structures, noted small vessel white matter ischemic changes. CT of the abdomen noted emphysema, scattered 2 mm nodules, cardiomegaly, cardiac pacers, median sternotomy, bilateral renal cysts, no bowel obstruction, no ascites, aneurysmal dilatation of the infrarenal abdominal aorta measuring up to 3.4 cm, large right inguinal hernia containing non-obstructed loops of small bowel, small fat-containing umbilical hernia, severe osteopenia, mild to moderate compression deformity throughout the visualized thoracolumbar spine. Packed red blood cells were transfused for symptomatic anemia and a pantoprazole infusion was initiated. The patient was seen by Gastroenterology in consultation and thought to benefit from endoscopic evaluation. Developed dyspnea overnight and intravenous fluids discontinued with furosemide administered for diuresis. The patient was seen by Cardiology for perioperative evaluation. The patient had no further melena. The patient underwent upper endoscopy which noted erosive antral gastritis, two clean based duodenal bulb ulcers, bulbar duodenitis, colonoscopy noted internal hemorrhoids. The patient was placed on an advancing diet. He was discharged home with instructions to follow up with his primary care physician and Gastroenterology for further management. The recommendations and CT findings were discussed with the patient's son at the bedside.        35 minutes total time

## 2019-08-20 NOTE — DISCHARGE NOTE PROVIDER - CARE PROVIDER_API CALL
Carlos Alberto Johnson ()  Internal Medicine; Nuclear Cardiology  [address], [place] [address]  Phone: [phone]  Fax: [phone]  Follow Up Time: 2 weeks    Laith Johnson)  Gastroenterology; Internal Medicine  [address]  [place] [address]  Phone: [phone]  Fax: [phone]  Follow Up Time: 2 weeks

## 2019-08-21 LAB — SURGICAL PATHOLOGY STUDY: SIGNIFICANT CHANGE UP

## 2019-09-09 ENCOUNTER — OTHER (OUTPATIENT)
Age: 84
End: 2019-09-09

## 2019-09-12 PROBLEM — K40.90 UNILATERAL INGUINAL HERNIA, WITHOUT OBSTRUCTION OR GANGRENE, NOT SPECIFIED AS RECURRENT: Chronic | Status: ACTIVE | Noted: 2019-08-16

## 2019-09-12 PROBLEM — E78.5 HYPERLIPIDEMIA, UNSPECIFIED: Chronic | Status: ACTIVE | Noted: 2019-08-16

## 2019-09-12 PROBLEM — I10 ESSENTIAL (PRIMARY) HYPERTENSION: Chronic | Status: ACTIVE | Noted: 2019-08-16

## 2019-10-01 ENCOUNTER — OUTPATIENT (OUTPATIENT)
Dept: OUTPATIENT SERVICES | Facility: HOSPITAL | Age: 84
LOS: 1 days | End: 2019-10-01
Payer: MEDICARE

## 2019-10-01 PROCEDURE — G9001: CPT

## 2019-10-03 ENCOUNTER — EMERGENCY (EMERGENCY)
Facility: HOSPITAL | Age: 84
LOS: 1 days | Discharge: HOME | End: 2019-10-03
Attending: EMERGENCY MEDICINE
Payer: COMMERCIAL

## 2019-10-03 VITALS
HEIGHT: 60 IN | TEMPERATURE: 97 F | DIASTOLIC BLOOD PRESSURE: 69 MMHG | WEIGHT: 102.07 LBS | SYSTOLIC BLOOD PRESSURE: 106 MMHG | HEART RATE: 90 BPM | RESPIRATION RATE: 18 BRPM | OXYGEN SATURATION: 96 %

## 2019-10-03 VITALS
HEART RATE: 86 BPM | OXYGEN SATURATION: 96 % | DIASTOLIC BLOOD PRESSURE: 70 MMHG | RESPIRATION RATE: 18 BRPM | SYSTOLIC BLOOD PRESSURE: 110 MMHG

## 2019-10-03 LAB
ALBUMIN SERPL ELPH-MCNC: 3.8 G/DL — SIGNIFICANT CHANGE UP (ref 3.3–5.2)
ALP SERPL-CCNC: 112 U/L — SIGNIFICANT CHANGE UP (ref 40–120)
ALT FLD-CCNC: 12 U/L — SIGNIFICANT CHANGE UP
ANION GAP SERPL CALC-SCNC: 13 MMOL/L — SIGNIFICANT CHANGE UP (ref 5–17)
APPEARANCE UR: CLEAR — SIGNIFICANT CHANGE UP
AST SERPL-CCNC: 28 U/L — SIGNIFICANT CHANGE UP
BASOPHILS # BLD AUTO: 0.03 K/UL — SIGNIFICANT CHANGE UP (ref 0–0.2)
BASOPHILS NFR BLD AUTO: 0.5 % — SIGNIFICANT CHANGE UP (ref 0–2)
BILIRUB SERPL-MCNC: 0.5 MG/DL — SIGNIFICANT CHANGE UP (ref 0.4–2)
BILIRUB UR-MCNC: NEGATIVE — SIGNIFICANT CHANGE UP
BUN SERPL-MCNC: 8 MG/DL — SIGNIFICANT CHANGE UP (ref 8–20)
CALCIUM SERPL-MCNC: 9.7 MG/DL — SIGNIFICANT CHANGE UP (ref 8.6–10.2)
CHLORIDE SERPL-SCNC: 100 MMOL/L — SIGNIFICANT CHANGE UP (ref 98–107)
CO2 SERPL-SCNC: 25 MMOL/L — SIGNIFICANT CHANGE UP (ref 22–29)
COLOR SPEC: YELLOW — SIGNIFICANT CHANGE UP
CREAT SERPL-MCNC: 0.6 MG/DL — SIGNIFICANT CHANGE UP (ref 0.5–1.3)
DIFF PNL FLD: NEGATIVE — SIGNIFICANT CHANGE UP
EOSINOPHIL # BLD AUTO: 0.1 K/UL — SIGNIFICANT CHANGE UP (ref 0–0.5)
EOSINOPHIL NFR BLD AUTO: 1.6 % — SIGNIFICANT CHANGE UP (ref 0–6)
GLUCOSE SERPL-MCNC: 100 MG/DL — SIGNIFICANT CHANGE UP (ref 70–115)
GLUCOSE UR QL: NEGATIVE MG/DL — SIGNIFICANT CHANGE UP
HCT VFR BLD CALC: 38 % — LOW (ref 39–50)
HGB BLD-MCNC: 11.3 G/DL — LOW (ref 13–17)
IMM GRANULOCYTES NFR BLD AUTO: 0.3 % — SIGNIFICANT CHANGE UP (ref 0–1.5)
KETONES UR-MCNC: ABNORMAL
LEUKOCYTE ESTERASE UR-ACNC: NEGATIVE — SIGNIFICANT CHANGE UP
LIDOCAIN IGE QN: 35 U/L — SIGNIFICANT CHANGE UP (ref 22–51)
LYMPHOCYTES # BLD AUTO: 1.49 K/UL — SIGNIFICANT CHANGE UP (ref 1–3.3)
LYMPHOCYTES # BLD AUTO: 23.7 % — SIGNIFICANT CHANGE UP (ref 13–44)
MCHC RBC-ENTMCNC: 27.2 PG — SIGNIFICANT CHANGE UP (ref 27–34)
MCHC RBC-ENTMCNC: 29.7 GM/DL — LOW (ref 32–36)
MCV RBC AUTO: 91.6 FL — SIGNIFICANT CHANGE UP (ref 80–100)
MONOCYTES # BLD AUTO: 0.64 K/UL — SIGNIFICANT CHANGE UP (ref 0–0.9)
MONOCYTES NFR BLD AUTO: 10.2 % — SIGNIFICANT CHANGE UP (ref 2–14)
NEUTROPHILS # BLD AUTO: 4 K/UL — SIGNIFICANT CHANGE UP (ref 1.8–7.4)
NEUTROPHILS NFR BLD AUTO: 63.7 % — SIGNIFICANT CHANGE UP (ref 43–77)
NITRITE UR-MCNC: NEGATIVE — SIGNIFICANT CHANGE UP
PH UR: 6 — SIGNIFICANT CHANGE UP (ref 5–8)
PLATELET # BLD AUTO: 179 K/UL — SIGNIFICANT CHANGE UP (ref 150–400)
POTASSIUM SERPL-MCNC: 4.4 MMOL/L — SIGNIFICANT CHANGE UP (ref 3.5–5.3)
POTASSIUM SERPL-SCNC: 4.4 MMOL/L — SIGNIFICANT CHANGE UP (ref 3.5–5.3)
PROT SERPL-MCNC: 8 G/DL — SIGNIFICANT CHANGE UP (ref 6.6–8.7)
PROT UR-MCNC: NEGATIVE MG/DL — SIGNIFICANT CHANGE UP
RBC # BLD: 4.15 M/UL — LOW (ref 4.2–5.8)
RBC # FLD: 17.2 % — HIGH (ref 10.3–14.5)
SODIUM SERPL-SCNC: 138 MMOL/L — SIGNIFICANT CHANGE UP (ref 135–145)
SP GR SPEC: 1.01 — SIGNIFICANT CHANGE UP (ref 1.01–1.02)
UROBILINOGEN FLD QL: NEGATIVE MG/DL — SIGNIFICANT CHANGE UP
WBC # BLD: 6.28 K/UL — SIGNIFICANT CHANGE UP (ref 3.8–10.5)
WBC # FLD AUTO: 6.28 K/UL — SIGNIFICANT CHANGE UP (ref 3.8–10.5)

## 2019-10-03 PROCEDURE — 80053 COMPREHEN METABOLIC PANEL: CPT

## 2019-10-03 PROCEDURE — 74177 CT ABD & PELVIS W/CONTRAST: CPT | Mod: 26

## 2019-10-03 PROCEDURE — 83690 ASSAY OF LIPASE: CPT

## 2019-10-03 PROCEDURE — 74177 CT ABD & PELVIS W/CONTRAST: CPT

## 2019-10-03 PROCEDURE — T1013: CPT

## 2019-10-03 PROCEDURE — 96374 THER/PROPH/DIAG INJ IV PUSH: CPT | Mod: XU

## 2019-10-03 PROCEDURE — 81003 URINALYSIS AUTO W/O SCOPE: CPT

## 2019-10-03 PROCEDURE — 99284 EMERGENCY DEPT VISIT MOD MDM: CPT | Mod: 25

## 2019-10-03 PROCEDURE — 99284 EMERGENCY DEPT VISIT MOD MDM: CPT

## 2019-10-03 PROCEDURE — 96361 HYDRATE IV INFUSION ADD-ON: CPT

## 2019-10-03 PROCEDURE — 36415 COLL VENOUS BLD VENIPUNCTURE: CPT

## 2019-10-03 PROCEDURE — 85027 COMPLETE CBC AUTOMATED: CPT

## 2019-10-03 RX ORDER — ATORVASTATIN CALCIUM 80 MG/1
1 TABLET, FILM COATED ORAL
Qty: 0 | Refills: 0 | DISCHARGE

## 2019-10-03 RX ORDER — SODIUM CHLORIDE 9 MG/ML
1000 INJECTION INTRAMUSCULAR; INTRAVENOUS; SUBCUTANEOUS ONCE
Refills: 0 | Status: COMPLETED | OUTPATIENT
Start: 2019-10-03 | End: 2019-10-03

## 2019-10-03 RX ORDER — ASPIRIN/CALCIUM CARB/MAGNESIUM 324 MG
1 TABLET ORAL
Qty: 0 | Refills: 0 | DISCHARGE

## 2019-10-03 RX ORDER — MORPHINE SULFATE 50 MG/1
4 CAPSULE, EXTENDED RELEASE ORAL ONCE
Refills: 0 | Status: DISCONTINUED | OUTPATIENT
Start: 2019-10-03 | End: 2019-10-03

## 2019-10-03 RX ORDER — TAMSULOSIN HYDROCHLORIDE 0.4 MG/1
1 CAPSULE ORAL
Qty: 0 | Refills: 0 | DISCHARGE

## 2019-10-03 RX ORDER — SPIRONOLACT/HYDROCHLOROTHIAZID 25 MG-25MG
1 TABLET ORAL
Qty: 0 | Refills: 0 | DISCHARGE

## 2019-10-03 RX ADMIN — MORPHINE SULFATE 4 MILLIGRAM(S): 50 CAPSULE, EXTENDED RELEASE ORAL at 11:13

## 2019-10-03 RX ADMIN — SODIUM CHLORIDE 1000 MILLILITER(S): 9 INJECTION INTRAMUSCULAR; INTRAVENOUS; SUBCUTANEOUS at 11:10

## 2019-10-03 RX ADMIN — MORPHINE SULFATE 4 MILLIGRAM(S): 50 CAPSULE, EXTENDED RELEASE ORAL at 10:13

## 2019-10-03 RX ADMIN — SODIUM CHLORIDE 1000 MILLILITER(S): 9 INJECTION INTRAMUSCULAR; INTRAVENOUS; SUBCUTANEOUS at 10:10

## 2019-10-03 NOTE — ED ADULT NURSE NOTE - NSIMPLEMENTINTERV_GEN_ALL_ED
Implemented All Fall Risk Interventions:  [place] to call system. Call bell, personal items and telephone within reach. Instruct patient to call for assistance. Room bathroom lighting operational. Non-slip footwear when patient is off stretcher. Physically safe environment: no spills, clutter or unnecessary equipment. Stretcher in lowest position, wheels locked, appropriate side rails in place. Provide visual cue, wrist band, yellow gown, etc. Monitor gait and stability. Monitor for mental status changes and reorient to person, place, and time. Review medications for side effects contributing to fall risk. Reinforce activity limits and safety measures with patient and family.

## 2019-10-03 NOTE — ED STATDOCS - OBJECTIVE STATEMENT
82y/o M with PMHx of HTN, HLD, Prostate CA and Inguinal hernia and PSHx of Hernia repair presents to the ED c/o diffuse, intermittent lower abdominal pain ,onset 3 days ago.  Pt reports that sx improve after passing gas. Son at bedside states pt presented to the ED 1 month ago for vomiting, dx with stomach virus. Denies N/V/D, fever or urinary complaints. 84y/o M with PMHx of HTN, HLD, Prostate CA and Inguinal hernia and PSHx of Hernia repair presents to the ED c/o diffuse, intermittent lower abdominal pain ,onset 3 days ago.  Pt reports that sx improve after passing gas. Son at bedside states pt presented to the ED 1 month ago for vomiting, dx with stomach virus. Denies N/V/D, fever or urinary complaints. No fevers, chills, trauma. No meds for symptoms. No back pain, chest pain, sob.

## 2019-10-03 NOTE — ED ADULT TRIAGE NOTE - CHIEF COMPLAINT QUOTE
Patient arrived to ED today with c/o abdominal pains for the past 3 days.  Patient denies n/v/d/fever.

## 2019-10-03 NOTE — ED STATDOCS - PATIENT PORTAL LINK FT
You can access the FollowMyHealth Patient Portal offered by [facility] by registering at the following website: [email or web address]. By joining [employer]’s FollowMyHealth portal, you will also be able to view your health information using other applications (apps) compatible with our system.

## 2019-10-03 NOTE — ED STATDOCS - CLINICAL SUMMARY MEDICAL DECISION MAKING FREE TEXT BOX
Pt p/w worsening Lower abd pain, tenderness left lower quadrant, well appearing will obtain labs, pain meds, UA and CT scan to r/o intra abdominal pathology.

## 2019-10-03 NOTE — ED ADULT NURSE REASSESSMENT NOTE - NS ED NURSE REASSESS COMMENT FT1
awake and alert,color good,skin warm and dry, resp unlabored, ambulatory to br, son remains with patient, no redness or swelling noted to iv site, comfortable in appearance

## 2019-10-03 NOTE — ED STATDOCS - PROGRESS NOTE DETAILS
82 year old male presents with intermittent abd pain. HPI/ ROS/ PEx as stated above. Labs, urine, CT abd ordered. CT with renal hypodenisty and inguinal hernia pt made aware. Pt to f/u with PMD and take Tylenol PRN. Pt understood. Return precautions given.

## 2019-10-04 DIAGNOSIS — Z71.89 OTHER SPECIFIED COUNSELING: ICD-10-CM

## 2019-10-14 ENCOUNTER — APPOINTMENT (OUTPATIENT)
Dept: GASTROENTEROLOGY | Facility: CLINIC | Age: 84
End: 2019-10-14

## 2019-12-02 NOTE — ED STATDOCS - ENMT, MLM
Full range of motion of upper and lower extremities, no joint tenderness/swelling.
Nasal mucosa clear.  Mouth with normal mucosa  Throat has no vesicles, no oropharyngeal exudates and uvula is midline.

## 2021-07-12 NOTE — ED ADULT TRIAGE NOTE - WEIGHT IN LBS
I called and left a message on machine for patient to return our call about confirming appointment   Kris Jensen,  
[phone]

## 2021-10-28 NOTE — ED ADULT NURSE NOTE - CAS TRG GENERAL AIRWAY, MLM
You have been referred to:General Surgery at the [facility] or the [facility] - Phone # is [phone]. Please call if you have not heard from that department in 3 days.     Patent

## 2025-05-20 NOTE — PATIENT PROFILE ADULT - NSPROPTRIGHTBILLOFRIGHTS_GEN_A_NUR
H&P reviewed. The patient was examined and there are no changes to the H&P.      Spl  14.1  14 patient